# Patient Record
Sex: MALE | Race: OTHER | Employment: OTHER | URBAN - METROPOLITAN AREA
[De-identification: names, ages, dates, MRNs, and addresses within clinical notes are randomized per-mention and may not be internally consistent; named-entity substitution may affect disease eponyms.]

---

## 2017-05-20 DIAGNOSIS — E78.2 MIXED HYPERLIPIDEMIA: Primary | ICD-10-CM

## 2017-05-20 RX ORDER — ATORVASTATIN CALCIUM 40 MG/1
TABLET, FILM COATED ORAL
Qty: 90 TABLET | Refills: 3 | Status: SHIPPED | OUTPATIENT
Start: 2017-05-20 | End: 2018-07-26

## 2017-06-23 ENCOUNTER — TELEPHONE (OUTPATIENT)
Dept: FAMILY MEDICINE CLINIC | Facility: CLINIC | Age: 52
End: 2017-06-23

## 2017-06-23 DIAGNOSIS — Z00.00 LABORATORY EXAMINATION ORDERED AS PART OF A ROUTINE GENERAL MEDICAL EXAMINATION: Primary | ICD-10-CM

## 2017-07-26 ENCOUNTER — APPOINTMENT (OUTPATIENT)
Dept: LAB | Age: 52
End: 2017-07-26
Attending: INTERNAL MEDICINE
Payer: COMMERCIAL

## 2017-07-26 ENCOUNTER — LAB ENCOUNTER (OUTPATIENT)
Dept: LAB | Age: 52
End: 2017-07-26
Attending: FAMILY MEDICINE
Payer: COMMERCIAL

## 2017-07-26 DIAGNOSIS — E78.2 MIXED HYPERLIPIDEMIA: ICD-10-CM

## 2017-07-26 DIAGNOSIS — Z00.00 LABORATORY EXAMINATION ORDERED AS PART OF A ROUTINE GENERAL MEDICAL EXAMINATION: ICD-10-CM

## 2017-07-26 DIAGNOSIS — I10 ESSENTIAL HYPERTENSION: ICD-10-CM

## 2017-07-26 DIAGNOSIS — I51.5 CARDIAC CALCIFICATION (HCC): ICD-10-CM

## 2017-07-26 LAB
25-HYDROXYVITAMIN D (TOTAL): 30.3 NG/ML (ref 30–100)
ALBUMIN SERPL-MCNC: 3.9 G/DL (ref 3.5–4.8)
ALP LIVER SERPL-CCNC: 79 U/L (ref 45–117)
ALT SERPL-CCNC: 44 U/L (ref 17–63)
AST SERPL-CCNC: 23 U/L (ref 15–41)
BASOPHILS # BLD AUTO: 0.04 X10(3) UL (ref 0–0.1)
BASOPHILS NFR BLD AUTO: 0.7 %
BILIRUB SERPL-MCNC: 1.1 MG/DL (ref 0.1–2)
BUN BLD-MCNC: 15 MG/DL (ref 8–20)
CALCIUM BLD-MCNC: 8.9 MG/DL (ref 8.3–10.3)
CHLORIDE: 106 MMOL/L (ref 101–111)
CHOLEST SMN-MCNC: 100 MG/DL (ref ?–200)
CO2: 29 MMOL/L (ref 22–32)
CREAT BLD-MCNC: 0.9 MG/DL (ref 0.7–1.3)
EOSINOPHIL # BLD AUTO: 0.3 X10(3) UL (ref 0–0.3)
EOSINOPHIL NFR BLD AUTO: 5.2 %
ERYTHROCYTE [DISTWIDTH] IN BLOOD BY AUTOMATED COUNT: 12.9 % (ref 11.5–16)
GLUCOSE BLD-MCNC: 96 MG/DL (ref 70–99)
HCT VFR BLD AUTO: 44.5 % (ref 37–53)
HDLC SERPL-MCNC: 38 MG/DL (ref 45–?)
HDLC SERPL: 2.63 {RATIO} (ref ?–4.97)
HGB BLD-MCNC: 15.3 G/DL (ref 13–17)
IMMATURE GRANULOCYTE COUNT: 0.01 X10(3) UL (ref 0–1)
IMMATURE GRANULOCYTE RATIO %: 0.2 %
LDLC SERPL CALC-MCNC: 34 MG/DL (ref ?–130)
LDLC SERPL-MCNC: 28 MG/DL (ref 5–40)
LYMPHOCYTES # BLD AUTO: 2.36 X10(3) UL (ref 0.9–4)
LYMPHOCYTES NFR BLD AUTO: 40.8 %
M PROTEIN MFR SERPL ELPH: 7.2 G/DL (ref 6.1–8.3)
MCH RBC QN AUTO: 31 PG (ref 27–33.2)
MCHC RBC AUTO-ENTMCNC: 34.4 G/DL (ref 31–37)
MCV RBC AUTO: 90.1 FL (ref 80–99)
MONOCYTES # BLD AUTO: 0.71 X10(3) UL (ref 0.1–0.6)
MONOCYTES NFR BLD AUTO: 12.3 %
NEUTROPHIL ABS PRELIM: 2.37 X10 (3) UL (ref 1.3–6.7)
NEUTROPHILS # BLD AUTO: 2.37 X10(3) UL (ref 1.3–6.7)
NEUTROPHILS NFR BLD AUTO: 40.8 %
NONHDLC SERPL-MCNC: 62 MG/DL (ref ?–130)
PLATELET # BLD AUTO: 167 10(3)UL (ref 150–450)
POTASSIUM SERPL-SCNC: 4.1 MMOL/L (ref 3.6–5.1)
RBC # BLD AUTO: 4.94 X10(6)UL (ref 4.3–5.7)
RED CELL DISTRIBUTION WIDTH-SD: 42.2 FL (ref 35.1–46.3)
SODIUM SERPL-SCNC: 142 MMOL/L (ref 136–144)
TRIGLYCERIDES: 139 MG/DL (ref ?–150)
TSI SER-ACNC: 1.47 MIU/ML (ref 0.35–5.5)
WBC # BLD AUTO: 5.8 X10(3) UL (ref 4–13)

## 2017-07-26 PROCEDURE — 80050 GENERAL HEALTH PANEL: CPT | Performed by: FAMILY MEDICINE

## 2017-07-26 PROCEDURE — 80061 LIPID PANEL: CPT | Performed by: FAMILY MEDICINE

## 2017-07-26 PROCEDURE — 82306 VITAMIN D 25 HYDROXY: CPT

## 2017-07-26 PROCEDURE — 36415 COLL VENOUS BLD VENIPUNCTURE: CPT

## 2017-08-02 ENCOUNTER — OFFICE VISIT (OUTPATIENT)
Dept: FAMILY MEDICINE CLINIC | Facility: CLINIC | Age: 52
End: 2017-08-02

## 2017-08-02 VITALS
HEIGHT: 69.75 IN | TEMPERATURE: 98 F | RESPIRATION RATE: 16 BRPM | SYSTOLIC BLOOD PRESSURE: 130 MMHG | HEART RATE: 80 BPM | BODY MASS INDEX: 36.92 KG/M2 | DIASTOLIC BLOOD PRESSURE: 68 MMHG | WEIGHT: 255 LBS

## 2017-08-02 DIAGNOSIS — Z12.11 SCREEN FOR COLON CANCER: ICD-10-CM

## 2017-08-02 DIAGNOSIS — I51.5 CARDIAC CALCIFICATION (HCC): ICD-10-CM

## 2017-08-02 DIAGNOSIS — E78.2 MIXED HYPERLIPIDEMIA: ICD-10-CM

## 2017-08-02 DIAGNOSIS — F33.41 DEPRESSION, MAJOR, RECURRENT, IN PARTIAL REMISSION (HCC): ICD-10-CM

## 2017-08-02 DIAGNOSIS — Z00.00 ANNUAL PHYSICAL EXAM: Primary | ICD-10-CM

## 2017-08-02 DIAGNOSIS — I10 ESSENTIAL HYPERTENSION: ICD-10-CM

## 2017-08-02 PROCEDURE — 99396 PREV VISIT EST AGE 40-64: CPT | Performed by: FAMILY MEDICINE

## 2017-08-02 PROCEDURE — 90715 TDAP VACCINE 7 YRS/> IM: CPT | Performed by: FAMILY MEDICINE

## 2017-08-02 PROCEDURE — 90471 IMMUNIZATION ADMIN: CPT | Performed by: FAMILY MEDICINE

## 2017-08-02 NOTE — PROGRESS NOTES
Yelena Mireles is a 45year old male who presents for a complete physical exam.   HPI:   Patient presents with:  Physical  Colonoscopy Screening: pt is due      His last annual assessment has been over 1 year: Annual Physical due on 01/06/2016       Pt compl Tab TAKE 1 TABLET BY MOUTH EVERY DAY IN THE EVENING   Losartan Potassium 50 MG Oral Tab Take 1 tablet (50 mg total) by mouth daily. aspirin 81 MG Oral Tab EC Take 1 tablet (81 mg total) by mouth daily.      No current facility-administered medications on external ear and ear canal normal.   Left Ear: Tympanic membrane, external ear and ear canal normal.   Nose: Nose normal.   Mouth/Throat: Uvula is midline, oropharynx is clear and moist and mucous membranes are normal. No oropharyngeal exudate.    Eyes: Con PLAN:   Francisco Javier Merritt is a 45year old male who presents for a complete physical exam. Pt's weight is Body mass index is 36.85 kg/m². , recommended low fat diet and aerobic exercise 30 minutes three times weekly.    Health maintenance, UTD Immunizations: UTD such as reading the newspaper or watching television: Not at all  Moving or speaking so slowly that other people could have noticed.  Or the opposite - being so fidgety or restless that you have been moving around a lot more than usual: Not at all  Thoughts

## 2017-08-03 NOTE — ASSESSMENT & PLAN NOTE
PHQ-9 Depression Screen     Little interest or pleasure in doing things: More than half the days  Feeling down, depressed, or hopeless: Not at all  Trouble falling or staying asleep, or sleeping too much: More than half the days  Feeling tired or having

## 2017-08-03 NOTE — ASSESSMENT & PLAN NOTE
Stable, Continue present management.     Cholesterol Lowering Medications          ATORVASTATIN 40 MG Oral Tab

## 2018-01-05 ENCOUNTER — TELEPHONE (OUTPATIENT)
Dept: FAMILY MEDICINE CLINIC | Facility: CLINIC | Age: 53
End: 2018-01-05

## 2018-07-21 ENCOUNTER — APPOINTMENT (OUTPATIENT)
Dept: LAB | Age: 53
End: 2018-07-21
Attending: INTERNAL MEDICINE
Payer: COMMERCIAL

## 2018-07-21 DIAGNOSIS — I10 ESSENTIAL HYPERTENSION WITH GOAL BLOOD PRESSURE LESS THAN 140/90: ICD-10-CM

## 2018-07-21 DIAGNOSIS — E78.2 MIXED HYPERLIPIDEMIA: ICD-10-CM

## 2018-07-21 LAB
ALBUMIN SERPL-MCNC: 4.1 G/DL (ref 3.5–4.8)
ALBUMIN/GLOB SERPL: 1.1 {RATIO} (ref 1–2)
ALP LIVER SERPL-CCNC: 86 U/L (ref 45–117)
ALT SERPL-CCNC: 45 U/L (ref 17–63)
ANION GAP SERPL CALC-SCNC: 7 MMOL/L (ref 0–18)
AST SERPL-CCNC: 23 U/L (ref 15–41)
BILIRUB SERPL-MCNC: 1.2 MG/DL (ref 0.1–2)
BUN BLD-MCNC: 15 MG/DL (ref 8–20)
BUN/CREAT SERPL: 16.1 (ref 10–20)
CALCIUM BLD-MCNC: 9 MG/DL (ref 8.3–10.3)
CHLORIDE SERPL-SCNC: 106 MMOL/L (ref 101–111)
CHOLEST SMN-MCNC: 97 MG/DL (ref ?–200)
CO2 SERPL-SCNC: 29 MMOL/L (ref 22–32)
CREAT BLD-MCNC: 0.93 MG/DL (ref 0.7–1.3)
GLOBULIN PLAS-MCNC: 3.7 G/DL (ref 2.5–3.7)
GLUCOSE BLD-MCNC: 92 MG/DL (ref 70–99)
HDLC SERPL-MCNC: 37 MG/DL (ref 45–?)
HDLC SERPL: 2.62 {RATIO} (ref ?–4.97)
LDLC SERPL CALC-MCNC: 36 MG/DL (ref ?–130)
M PROTEIN MFR SERPL ELPH: 7.8 G/DL (ref 6.1–8.3)
NONHDLC SERPL-MCNC: 60 MG/DL (ref ?–130)
OSMOLALITY SERPL CALC.SUM OF ELEC: 294 MOSM/KG (ref 275–295)
POTASSIUM SERPL-SCNC: 4.2 MMOL/L (ref 3.6–5.1)
SODIUM SERPL-SCNC: 142 MMOL/L (ref 136–144)
TRIGL SERPL-MCNC: 122 MG/DL (ref ?–150)
VLDLC SERPL CALC-MCNC: 24 MG/DL (ref 5–40)

## 2018-07-21 PROCEDURE — 80061 LIPID PANEL: CPT

## 2018-07-21 PROCEDURE — 36415 COLL VENOUS BLD VENIPUNCTURE: CPT

## 2018-07-21 PROCEDURE — 80053 COMPREHEN METABOLIC PANEL: CPT

## 2018-07-26 PROBLEM — R06.83 SNORING: Status: ACTIVE | Noted: 2018-07-26

## 2018-08-03 ENCOUNTER — TELEPHONE (OUTPATIENT)
Dept: FAMILY MEDICINE CLINIC | Facility: CLINIC | Age: 53
End: 2018-08-03

## 2018-08-03 DIAGNOSIS — Z11.59 ENCOUNTER FOR HEPATITIS C SCREENING TEST FOR LOW RISK PATIENT: ICD-10-CM

## 2018-08-03 DIAGNOSIS — Z12.5 SCREENING FOR PROSTATE CANCER: ICD-10-CM

## 2018-08-03 DIAGNOSIS — Z00.00 LABORATORY EXAMINATION ORDERED AS PART OF A ROUTINE GENERAL MEDICAL EXAMINATION: ICD-10-CM

## 2018-08-03 NOTE — TELEPHONE ENCOUNTER
Patient is scheduled for physical 9/19 please place lab orders to [facility]

## 2018-08-30 ENCOUNTER — APPOINTMENT (OUTPATIENT)
Dept: LAB | Age: 53
End: 2018-08-30
Attending: FAMILY MEDICINE
Payer: COMMERCIAL

## 2018-08-30 DIAGNOSIS — Z11.59 ENCOUNTER FOR HEPATITIS C SCREENING TEST FOR LOW RISK PATIENT: ICD-10-CM

## 2018-08-30 DIAGNOSIS — Z12.5 SCREENING FOR PROSTATE CANCER: ICD-10-CM

## 2018-08-30 DIAGNOSIS — Z00.00 LABORATORY EXAMINATION ORDERED AS PART OF A ROUTINE GENERAL MEDICAL EXAMINATION: ICD-10-CM

## 2018-08-30 LAB
COMPLEXED PSA SERPL-MCNC: 0.56 NG/ML (ref 0.01–4)
ERYTHROCYTE [DISTWIDTH] IN BLOOD BY AUTOMATED COUNT: 12.7 % (ref 11.5–16)
HCT VFR BLD AUTO: 46 % (ref 37–53)
HCV AB SERPL QL IA: NONREACTIVE
HGB BLD-MCNC: 15.8 G/DL (ref 13–17)
MCH RBC QN AUTO: 31.6 PG (ref 27–33.2)
MCHC RBC AUTO-ENTMCNC: 34.3 G/DL (ref 31–37)
MCV RBC AUTO: 92 FL (ref 80–99)
PLATELET # BLD AUTO: 183 10(3)UL (ref 150–450)
RBC # BLD AUTO: 5 X10(6)UL (ref 4.3–5.7)
RED CELL DISTRIBUTION WIDTH-SD: 42.7 FL (ref 35.1–46.3)
TSI SER-ACNC: 1.77 MIU/ML (ref 0.35–5.5)
WBC # BLD AUTO: 5.7 X10(3) UL (ref 4–13)

## 2018-08-30 PROCEDURE — 84153 ASSAY OF PSA TOTAL: CPT | Performed by: FAMILY MEDICINE

## 2018-08-30 PROCEDURE — 84443 ASSAY THYROID STIM HORMONE: CPT | Performed by: FAMILY MEDICINE

## 2018-08-30 PROCEDURE — 86803 HEPATITIS C AB TEST: CPT | Performed by: FAMILY MEDICINE

## 2018-08-30 PROCEDURE — 36415 COLL VENOUS BLD VENIPUNCTURE: CPT | Performed by: FAMILY MEDICINE

## 2018-08-30 PROCEDURE — 85027 COMPLETE CBC AUTOMATED: CPT | Performed by: FAMILY MEDICINE

## 2018-10-31 ENCOUNTER — OFFICE VISIT (OUTPATIENT)
Dept: FAMILY MEDICINE CLINIC | Facility: CLINIC | Age: 53
End: 2018-10-31
Payer: COMMERCIAL

## 2018-10-31 VITALS
HEART RATE: 80 BPM | WEIGHT: 250.38 LBS | DIASTOLIC BLOOD PRESSURE: 70 MMHG | SYSTOLIC BLOOD PRESSURE: 132 MMHG | TEMPERATURE: 100 F | BODY MASS INDEX: 35.45 KG/M2 | RESPIRATION RATE: 14 BRPM | HEIGHT: 70.4 IN

## 2018-10-31 DIAGNOSIS — F33.42 MAJOR DEPRESSION, RECURRENT, FULL REMISSION (HCC): ICD-10-CM

## 2018-10-31 DIAGNOSIS — E78.2 MIXED HYPERLIPIDEMIA: ICD-10-CM

## 2018-10-31 DIAGNOSIS — I10 ESSENTIAL HYPERTENSION: ICD-10-CM

## 2018-10-31 DIAGNOSIS — Z00.00 ANNUAL PHYSICAL EXAM: Primary | ICD-10-CM

## 2018-10-31 DIAGNOSIS — D17.20 LIPOMA OF FOREARM: ICD-10-CM

## 2018-10-31 PROCEDURE — 90471 IMMUNIZATION ADMIN: CPT | Performed by: FAMILY MEDICINE

## 2018-10-31 PROCEDURE — 90686 IIV4 VACC NO PRSV 0.5 ML IM: CPT | Performed by: FAMILY MEDICINE

## 2018-10-31 PROCEDURE — 99396 PREV VISIT EST AGE 40-64: CPT | Performed by: FAMILY MEDICINE

## 2018-10-31 NOTE — PROGRESS NOTES
Ariel Bee is a 52year old male who presents for a complete physical exam.   HPI:   Patient presents with:  Physical  Imm/Inj: flu shot      His last annual assessment has been over 1 year: Annual Physical due on 08/02/2018       Pt complains of doing w POTASSIUM 50 MG Oral Tab TAKE 1 TABLET BY MOUTH EVERY DAY   atorvastatin 40 MG Oral Tab TAKE 1 TABLET BY MOUTH EVERY DAY IN THE EVENING   aspirin 81 MG Oral Tab EC Take 1 tablet (81 mg total) by mouth daily.      No current facility-administered medications reviewed. Constitutional: He is oriented to person, place, and time. Vital signs are normal. He appears well-developed and well-nourished. No distress. HENT:   Head: Normocephalic and atraumatic.    Right Ear: Hearing, tympanic membrane, external ear an normal.   Skin: Skin is warm, dry and intact. No rash noted. He is not diaphoretic. No cyanosis or erythema. No pallor. Nails show no clubbing. Psychiatric: He has a normal mood and affect.  His speech is normal and behavior is normal. Judgment and though given.    Return in about 6 months (around 4/30/2019) for recheck.     Bren Mckeon MD, 10/31/2018, 9:04 AM

## 2020-03-26 ENCOUNTER — TELEPHONE (OUTPATIENT)
Dept: FAMILY MEDICINE CLINIC | Facility: CLINIC | Age: 55
End: 2020-03-26

## 2020-03-26 NOTE — TELEPHONE ENCOUNTER
+exposure to COVID from coworker. Works for [employer]. Share space and     Throat itch, feels like taste buds down a little bit. Has a mask he wears to work as of today.    Rest at home    He has had enough of an exposure these symptoms could be early COVI

## 2020-03-26 NOTE — TELEPHONE ENCOUNTER
Patient called requesting guidance, states his co-worker recently tested positive for COVID-19 and is very concerned because started having a sore throat.

## 2020-03-27 ENCOUNTER — TELEPHONE (OUTPATIENT)
Dept: FAMILY MEDICINE CLINIC | Facility: CLINIC | Age: 55
End: 2020-03-27

## 2020-03-27 DIAGNOSIS — J06.9 VIRAL UPPER RESPIRATORY TRACT INFECTION: Primary | ICD-10-CM

## 2020-03-27 DIAGNOSIS — R05.9 COUGH: ICD-10-CM

## 2020-03-27 DIAGNOSIS — R50.9 FEVER, UNSPECIFIED FEVER CAUSE: ICD-10-CM

## 2020-03-27 NOTE — TELEPHONE ENCOUNTER
Pt was exposed to a co-worker who was positive for COVID-19. His wife was also exposed to COVID-19. Pt has fever of 101, chills, dry cough, body aches, sore throat, night sweats. Pt is taking Tylenol and it is not bringing fever down.     Pt is requesting C

## 2020-03-27 NOTE — TELEPHONE ENCOUNTER
Patient is calling requesting to speak to Dr. Yamil Pinedo stating fever has not gone down. Fever of 101 all night with body aches, chills, sweats, and dry cough. Pt spoke to Dr. Jada Bellamy about this yesterday.

## 2020-03-27 NOTE — TELEPHONE ENCOUNTER
Ronnie Phillips is a 60year old male who is being evaluated for respiratory symptoms.   Contact with COVID-19 positive person- coworker  Does the patient currently have or has the patient previously had symptoms of an acute respiratory infection:  Fever (T >

## 2020-03-29 ENCOUNTER — TELEPHONE (OUTPATIENT)
Dept: INTERNAL MEDICINE CLINIC | Facility: CLINIC | Age: 55
End: 2020-03-29

## 2020-03-30 ENCOUNTER — LAB ENCOUNTER (OUTPATIENT)
Dept: LAB | Facility: HOSPITAL | Age: 55
End: 2020-03-30
Attending: FAMILY MEDICINE
Payer: COMMERCIAL

## 2020-03-30 DIAGNOSIS — R05.9 COUGH: ICD-10-CM

## 2020-03-30 DIAGNOSIS — J06.9 VIRAL UPPER RESPIRATORY TRACT INFECTION: ICD-10-CM

## 2020-03-30 DIAGNOSIS — R50.9 FEVER, UNSPECIFIED FEVER CAUSE: ICD-10-CM

## 2020-03-31 ENCOUNTER — TELEPHONE (OUTPATIENT)
Dept: FAMILY MEDICINE CLINIC | Facility: CLINIC | Age: 55
End: 2020-03-31

## 2020-03-31 DIAGNOSIS — Z20.822 SUSPECTED 2019 NOVEL CORONAVIRUS INFECTION: ICD-10-CM

## 2020-03-31 DIAGNOSIS — R50.9 FEVER, UNSPECIFIED FEVER CAUSE: ICD-10-CM

## 2020-03-31 DIAGNOSIS — R05.9 COUGH: ICD-10-CM

## 2020-03-31 DIAGNOSIS — J06.9 VIRAL UPPER RESPIRATORY TRACT INFECTION: Primary | ICD-10-CM

## 2020-03-31 PROCEDURE — 99213 OFFICE O/P EST LOW 20 MIN: CPT | Performed by: FAMILY MEDICINE

## 2020-03-31 NOTE — TELEPHONE ENCOUNTER
Virtual/Telephone Check-In    Cinda Reynoso verbally consents to a Virtual/Telephone Check-In service on 03/31/20. Patient understands and accepts financial responsibility for any deductible, co-insurance and/or co-pays associated with this service.     Dur

## 2020-03-31 NOTE — TELEPHONE ENCOUNTER
Patient called to give updates. Pt is still having a fever, body aches, and chills. Pt also stated it hurts when taking deep breaths. Please contact patient.

## 2020-04-01 ENCOUNTER — TELEPHONE (OUTPATIENT)
Dept: FAMILY MEDICINE CLINIC | Facility: CLINIC | Age: 55
End: 2020-04-01

## 2020-04-01 DIAGNOSIS — U07.1 COVID-19 VIRUS INFECTION: Primary | ICD-10-CM

## 2020-04-01 NOTE — TELEPHONE ENCOUNTER
covid positive follow up call. Better with OTC meds, hot steam and good appetite, fever 97, up to 100  Speaking in full sentences, no increased work of breathing. A&O x 3.      Problem List Items Addressed This Visit     None      Visit Diagnoses     CO

## 2020-04-02 ENCOUNTER — TELEPHONE (OUTPATIENT)
Dept: FAMILY MEDICINE CLINIC | Facility: CLINIC | Age: 55
End: 2020-04-02

## 2020-04-02 NOTE — TELEPHONE ENCOUNTER
Talk to patient, he is about 10 to 15% better today than yesterday and he was better yesterday versus Tuesday. That is good news but today is day 9 so we will continue to follow-up either through my chart or phone calls every day.   He is self isolating an

## 2020-04-04 ENCOUNTER — TELEPHONE (OUTPATIENT)
Dept: FAMILY MEDICINE CLINIC | Facility: CLINIC | Age: 55
End: 2020-04-04

## 2020-04-04 DIAGNOSIS — U07.1 PNEUMONIA DUE TO 2019 NOVEL CORONAVIRUS: ICD-10-CM

## 2020-04-04 DIAGNOSIS — R05.9 COUGH: ICD-10-CM

## 2020-04-04 DIAGNOSIS — J12.82 PNEUMONIA DUE TO 2019 NOVEL CORONAVIRUS: ICD-10-CM

## 2020-04-04 PROCEDURE — 99212 OFFICE O/P EST SF 10 MIN: CPT | Performed by: FAMILY MEDICINE

## 2020-04-04 NOTE — TELEPHONE ENCOUNTER
Hard cough this AM, but breathing ok, low grade tmpe today so still not clear, but day 11 of sx, COVID positicVirtual/Telephone Check-In    Mark Cortes verbally consents to a Virtual/Telephone Check-In service on 04/04/20.   Patient understands and accepts

## 2020-04-05 ENCOUNTER — TELEPHONE (OUTPATIENT)
Dept: INTERNAL MEDICINE CLINIC | Facility: CLINIC | Age: 55
End: 2020-04-05

## 2020-04-05 DIAGNOSIS — R06.02 SOB (SHORTNESS OF BREATH): Primary | ICD-10-CM

## 2020-04-05 RX ORDER — ALBUTEROL SULFATE 90 UG/1
2 AEROSOL, METERED RESPIRATORY (INHALATION) EVERY 4 HOURS PRN
Qty: 1 INHALER | Refills: 0 | Status: SHIPPED | OUTPATIENT
Start: 2020-04-05 | End: 2020-08-28 | Stop reason: ALTCHOICE

## 2020-04-05 NOTE — TELEPHONE ENCOUNTER
Pt paged me at 2:49 pm. Pt tested positive for Covid on 3/30/20. Pt has been having fevers for awhile. This  ,now temperature is 99. Pt states has some congestion, occ cough, but has noticed SOB if going up the stairs from the basement.  Pt states it

## 2020-04-06 ENCOUNTER — TELEPHONE (OUTPATIENT)
Dept: FAMILY MEDICINE CLINIC | Facility: CLINIC | Age: 55
End: 2020-04-06

## 2020-04-08 ENCOUNTER — TELEPHONE (OUTPATIENT)
Dept: FAMILY MEDICINE CLINIC | Facility: CLINIC | Age: 55
End: 2020-04-08

## 2020-04-08 DIAGNOSIS — U07.1 COVID-19 VIRUS INFECTION: Primary | ICD-10-CM

## 2020-04-08 DIAGNOSIS — R05.9 COUGH: ICD-10-CM

## 2020-04-08 PROCEDURE — 99213 OFFICE O/P EST LOW 20 MIN: CPT | Performed by: FAMILY MEDICINE

## 2020-04-08 NOTE — TELEPHONE ENCOUNTER
Virtual/Telephone Check-In    Cinda Reynoso verbally consents to a Virtual/Telephone Check-In service on 4/8/2020. Patient understands and accepts financial responsibility for any deductible, co-insurance and/or co-pays associated with this service.     Pt

## 2020-04-08 NOTE — TELEPHONE ENCOUNTER
COVID 19, positive, doing a lot better. Still some SOB, but much better.  70 % pre illness functinoing, maybe back at work next week   Fever free over 72 hours as of early this morning so I missed his first call because he had actually been able to get out

## 2020-04-13 ENCOUNTER — VIRTUAL PHONE E/M (OUTPATIENT)
Dept: FAMILY MEDICINE CLINIC | Facility: CLINIC | Age: 55
End: 2020-04-13
Payer: COMMERCIAL

## 2020-04-13 DIAGNOSIS — U07.1 COVID-19 VIRUS INFECTION: Primary | ICD-10-CM

## 2020-04-13 DIAGNOSIS — U07.1 PNEUMONIA DUE TO 2019 NOVEL CORONAVIRUS: ICD-10-CM

## 2020-04-13 DIAGNOSIS — J12.82 PNEUMONIA DUE TO 2019 NOVEL CORONAVIRUS: ICD-10-CM

## 2020-04-13 DIAGNOSIS — K21.9 GASTROESOPHAGEAL REFLUX DISEASE WITHOUT ESOPHAGITIS: ICD-10-CM

## 2020-04-13 PROCEDURE — 99214 OFFICE O/P EST MOD 30 MIN: CPT | Performed by: FAMILY MEDICINE

## 2020-04-13 RX ORDER — OMEPRAZOLE 40 MG/1
40 CAPSULE, DELAYED RELEASE ORAL DAILY
Qty: 30 CAPSULE | Refills: 2 | Status: SHIPPED | OUTPATIENT
Start: 2020-04-13 | End: 2020-07-06

## 2020-04-13 NOTE — PROGRESS NOTES
Virtual/Telephone Check-In    Lisandra Corpus verbally consents to a Virtual/Telephone Check-In service on 4/13/2020. Patient understands and accepts financial responsibility for any deductible, co-insurance and/or co-pays associated with this service.     Do

## 2020-07-05 DIAGNOSIS — K21.9 GASTROESOPHAGEAL REFLUX DISEASE WITHOUT ESOPHAGITIS: ICD-10-CM

## 2020-07-06 RX ORDER — OMEPRAZOLE 40 MG/1
CAPSULE, DELAYED RELEASE ORAL
Qty: 90 CAPSULE | Refills: 0 | Status: SHIPPED | OUTPATIENT
Start: 2020-07-06 | End: 2020-07-30

## 2020-07-06 NOTE — TELEPHONE ENCOUNTER
Refill request for:    Requested Prescriptions     Pending Prescriptions Disp Refills   • OMEPRAZOLE 40 MG Oral Capsule Delayed Release [Pharmacy Med Name: OMEPRAZOLE DR 40 MG CAPSULE] 90 capsule 0     Sig: TAKE 1 CAPSULE BY MOUTH EVERY DAY        Last Pre

## 2020-07-14 ENCOUNTER — TELEPHONE (OUTPATIENT)
Dept: FAMILY MEDICINE CLINIC | Facility: CLINIC | Age: 55
End: 2020-07-14

## 2020-07-14 DIAGNOSIS — Z00.00 LABORATORY EXAMINATION ORDERED AS PART OF A ROUTINE GENERAL MEDICAL EXAMINATION: ICD-10-CM

## 2020-07-14 DIAGNOSIS — Z12.5 SCREENING FOR PROSTATE CANCER: ICD-10-CM

## 2020-07-14 NOTE — TELEPHONE ENCOUNTER
Please enter lab orders for the patient's upcoming physical appointment. Physical scheduled:    Your appointments     Date & Time Appointment Department [facility])    Jul 30, 2020  4:00 PM CDT Physical - Established with Chico Rodriguez MD [facility]

## 2020-07-28 ENCOUNTER — APPOINTMENT (OUTPATIENT)
Dept: LAB | Age: 55
End: 2020-07-28
Attending: FAMILY MEDICINE
Payer: COMMERCIAL

## 2020-07-28 DIAGNOSIS — Z12.5 SCREENING FOR PROSTATE CANCER: ICD-10-CM

## 2020-07-28 DIAGNOSIS — Z00.00 LABORATORY EXAMINATION ORDERED AS PART OF A ROUTINE GENERAL MEDICAL EXAMINATION: ICD-10-CM

## 2020-07-28 LAB
ALBUMIN SERPL-MCNC: 4 G/DL (ref 3.4–5)
ALBUMIN/GLOB SERPL: 1.1 {RATIO} (ref 1–2)
ALP LIVER SERPL-CCNC: 83 U/L (ref 45–117)
ALT SERPL-CCNC: 47 U/L (ref 16–61)
ANION GAP SERPL CALC-SCNC: 0 MMOL/L (ref 0–18)
AST SERPL-CCNC: 30 U/L (ref 15–37)
BILIRUB SERPL-MCNC: 0.8 MG/DL (ref 0.1–2)
BUN BLD-MCNC: 11 MG/DL (ref 7–18)
BUN/CREAT SERPL: 11.8 (ref 10–20)
CALCIUM BLD-MCNC: 8.8 MG/DL (ref 8.5–10.1)
CHLORIDE SERPL-SCNC: 108 MMOL/L (ref 98–112)
CHOLEST SMN-MCNC: 99 MG/DL (ref ?–200)
CO2 SERPL-SCNC: 31 MMOL/L (ref 21–32)
COMPLEXED PSA SERPL-MCNC: 0.6 NG/ML (ref ?–4)
CREAT BLD-MCNC: 0.93 MG/DL (ref 0.7–1.3)
DEPRECATED RDW RBC AUTO: 43.6 FL (ref 35.1–46.3)
ERYTHROCYTE [DISTWIDTH] IN BLOOD BY AUTOMATED COUNT: 12.8 % (ref 11–15)
GLOBULIN PLAS-MCNC: 3.6 G/DL (ref 2.8–4.4)
GLUCOSE BLD-MCNC: 91 MG/DL (ref 70–99)
HCT VFR BLD AUTO: 46.6 % (ref 39–53)
HDLC SERPL-MCNC: 36 MG/DL (ref 40–59)
HGB BLD-MCNC: 15.9 G/DL (ref 13–17.5)
LDLC SERPL CALC-MCNC: 43 MG/DL (ref ?–100)
M PROTEIN MFR SERPL ELPH: 7.6 G/DL (ref 6.4–8.2)
MCH RBC QN AUTO: 32.1 PG (ref 26–34)
MCHC RBC AUTO-ENTMCNC: 34.1 G/DL (ref 31–37)
MCV RBC AUTO: 94.1 FL (ref 80–100)
NONHDLC SERPL-MCNC: 63 MG/DL (ref ?–130)
OSMOLALITY SERPL CALC.SUM OF ELEC: 287 MOSM/KG (ref 275–295)
PATIENT FASTING Y/N/NP: YES
PATIENT FASTING Y/N/NP: YES
PLATELET # BLD AUTO: 168 10(3)UL (ref 150–450)
POTASSIUM SERPL-SCNC: 4.2 MMOL/L (ref 3.5–5.1)
RBC # BLD AUTO: 4.95 X10(6)UL (ref 4.3–5.7)
SODIUM SERPL-SCNC: 139 MMOL/L (ref 136–145)
TRIGL SERPL-MCNC: 102 MG/DL (ref 30–149)
TSI SER-ACNC: 1.72 MIU/ML (ref 0.36–3.74)
VLDLC SERPL CALC-MCNC: 20 MG/DL (ref 0–30)
WBC # BLD AUTO: 5.7 X10(3) UL (ref 4–11)

## 2020-07-28 PROCEDURE — 80061 LIPID PANEL: CPT | Performed by: FAMILY MEDICINE

## 2020-07-28 PROCEDURE — 80050 GENERAL HEALTH PANEL: CPT | Performed by: FAMILY MEDICINE

## 2020-07-28 PROCEDURE — 84153 ASSAY OF PSA TOTAL: CPT | Performed by: FAMILY MEDICINE

## 2020-07-28 PROCEDURE — 36415 COLL VENOUS BLD VENIPUNCTURE: CPT | Performed by: FAMILY MEDICINE

## 2020-07-30 ENCOUNTER — OFFICE VISIT (OUTPATIENT)
Dept: FAMILY MEDICINE CLINIC | Facility: CLINIC | Age: 55
End: 2020-07-30
Payer: COMMERCIAL

## 2020-07-30 VITALS
DIASTOLIC BLOOD PRESSURE: 70 MMHG | WEIGHT: 246 LBS | HEART RATE: 80 BPM | SYSTOLIC BLOOD PRESSURE: 136 MMHG | HEIGHT: 70 IN | RESPIRATION RATE: 16 BRPM | BODY MASS INDEX: 35.22 KG/M2 | TEMPERATURE: 98 F

## 2020-07-30 DIAGNOSIS — E78.2 MIXED HYPERLIPIDEMIA: ICD-10-CM

## 2020-07-30 DIAGNOSIS — Z00.00 ANNUAL PHYSICAL EXAM: Primary | ICD-10-CM

## 2020-07-30 DIAGNOSIS — I10 ESSENTIAL HYPERTENSION: ICD-10-CM

## 2020-07-30 DIAGNOSIS — F33.42 MAJOR DEPRESSION, RECURRENT, FULL REMISSION (HCC): ICD-10-CM

## 2020-07-30 PROCEDURE — 3078F DIAST BP <80 MM HG: CPT | Performed by: FAMILY MEDICINE

## 2020-07-30 PROCEDURE — 3075F SYST BP GE 130 - 139MM HG: CPT | Performed by: FAMILY MEDICINE

## 2020-07-30 PROCEDURE — 3008F BODY MASS INDEX DOCD: CPT | Performed by: FAMILY MEDICINE

## 2020-07-30 PROCEDURE — 99396 PREV VISIT EST AGE 40-64: CPT | Performed by: FAMILY MEDICINE

## 2020-07-30 NOTE — PROGRESS NOTES
Nora Gupta is a 60year old male who presents for a complete physical exam.   HPI:   Patient presents with:  Physical: Review Blood Work Results from 07-      His last annual assessment has been over 1 year: Annual Physical due on 10/31/2019 07:06 AM    PSA 0.436 12/22/2014 07:03 AM            LOSARTAN POTASSIUM 50 MG Oral Tab, TAKE 1 TABLET BY MOUTH EVERY DAY  ATORVASTATIN 40 MG Oral Tab, TAKE 1 TABLET BY MOUTH EVERY DAY IN THE EVENING  aspirin 81 MG Oral Tab EC, Take 1 tablet (81 mg total) b Ht 70\"   Wt 246 lb (111.6 kg)   BMI 35.30 kg/m²  Estimated body mass index is 35.3 kg/m² as calculated from the following:    Height as of this encounter: 70\". Weight as of this encounter: 246 lb (111.6 kg).    Physical Exam   Nursing note and vitals erythema. Nails show no clubbing. Psychiatric: He has a normal mood and affect.  His speech is normal and behavior is normal. Judgment and thought content normal. Cognition and memory are normal.       ASSESSMENT AND PLAN:   Lianna Bailey is a 60year old management   Return in about 6 months (around 1/30/2021) for recheck.     Stacie Hill MD, 7/30/2020, 4:27 PM

## 2020-11-07 ENCOUNTER — HOSPITAL ENCOUNTER (OUTPATIENT)
Age: 55
Discharge: HOME | End: 2020-11-07
Attending: EMERGENCY MEDICINE
Payer: COMMERCIAL

## 2020-11-07 ENCOUNTER — APPOINTMENT (OUTPATIENT)
Dept: CT IMAGING | Age: 55
End: 2020-11-07
Attending: EMERGENCY MEDICINE
Payer: COMMERCIAL

## 2020-11-07 VITALS
RESPIRATION RATE: 18 BRPM | WEIGHT: 250 LBS | HEIGHT: 70 IN | TEMPERATURE: 98 F | HEART RATE: 76 BPM | DIASTOLIC BLOOD PRESSURE: 82 MMHG | BODY MASS INDEX: 35.79 KG/M2 | OXYGEN SATURATION: 98 % | SYSTOLIC BLOOD PRESSURE: 132 MMHG

## 2020-11-07 DIAGNOSIS — H11.32 SUBCONJUNCTIVAL HEMORRHAGE OF LEFT EYE: Primary | ICD-10-CM

## 2020-11-07 PROCEDURE — 99214 OFFICE O/P EST MOD 30 MIN: CPT

## 2020-11-07 PROCEDURE — 70450 CT HEAD/BRAIN W/O DYE: CPT | Performed by: EMERGENCY MEDICINE

## 2020-11-07 PROCEDURE — 99204 OFFICE O/P NEW MOD 45 MIN: CPT

## 2020-11-07 NOTE — ED INITIAL ASSESSMENT (HPI)
Pt noticed yesterday some bleeding to lt eye. Also, c/o pressure to back of head that started yesterday. Denies n/v/d. Denies sob. Denies any weakness.

## 2020-11-07 NOTE — ED PROVIDER NOTES
Patient Seen in: [facility]      History   Patient presents with:  Headache    Stated Complaint: eye area issue / head pressure / vein issue in orbital area    HPI    Jenni Branham is a pleasant 47-year-old gentleman, with complaints of eye compla Does not cross the limbus. Otherwise HEENT exam is normal with no midline cervical spine tenderness to palpation.   Lungs normal cardiovascular normal abdomen normal patient is noted to have cranial nerves II through XII intact strength 5 of 5 upper and lo

## 2020-11-16 ENCOUNTER — TELEPHONE (OUTPATIENT)
Dept: FAMILY MEDICINE CLINIC | Facility: CLINIC | Age: 55
End: 2020-11-16

## 2020-11-16 NOTE — TELEPHONE ENCOUNTER
LOV 7/30/2020 and at that time, it stated that pt is currently not on any meds for depression.     Future Appointments   Date Time Provider Carly Sparks   9/8/2021  4:00 PM Michael Espinoza MD [facility]       Pt agrees to a virtual vi

## 2020-11-18 NOTE — PROGRESS NOTES
Virtual/Telephone Check-In    Augie Gutierrez verbally consents to a Virtual/Telephone Check-In service on 11/18/20. Patient has been referred to the [facility] website at [email or web address] to review the yearly Consent to Treat document.   Patient understand 11/18/2020   Trouble concentrating on things, such as reading the newspaper or watching television: 1, done 11/18/2020   If you checked off any problems, how difficult have these problems made it for you to do your work, take care of things at home, or get

## 2020-12-11 NOTE — TELEPHONE ENCOUNTER
Refill request for:    Requested Prescriptions     Pending Prescriptions Disp Refills   • VENLAFAXINE HCL ER 75 MG Oral Capsule SR 24 Hr [Pharmacy Med Name: VENLAFAXINE HCL ER 75 MG CAP] 60 capsule 0     Sig: TAKE 1 CAPSULE BY MOUTH FOR 4 DAYS, THEN TAKE 1

## 2020-12-15 RX ORDER — VENLAFAXINE HYDROCHLORIDE 75 MG/1
CAPSULE, EXTENDED RELEASE ORAL
Qty: 60 CAPSULE | Refills: 0 | OUTPATIENT
Start: 2020-12-15

## 2020-12-15 RX ORDER — VENLAFAXINE HYDROCHLORIDE 75 MG/1
CAPSULE, EXTENDED RELEASE ORAL
Qty: 60 CAPSULE | Refills: 0 | Status: CANCELLED | OUTPATIENT
Start: 2020-12-15 | End: 2021-01-16

## 2020-12-16 RX ORDER — VENLAFAXINE HYDROCHLORIDE 75 MG/1
75 TABLET, EXTENDED RELEASE ORAL 2 TIMES DAILY
Qty: 60 TABLET | Refills: 4 | Status: SHIPPED | OUTPATIENT
Start: 2020-12-16 | End: 2020-12-28

## 2020-12-16 NOTE — TELEPHONE ENCOUNTER
Patient is calling back today, he scheduled his appt but he does not have enough medication to get to that appt he only has 2 pills left. Please give him enough to get to the appt.

## 2020-12-28 NOTE — PROGRESS NOTES
Virtual Telephone Check-In    Hung Hernandez verbally consents to a Virtual/Telephone Check-In visit on 12/28/20. Patient has been referred to the [facility] website at [email or web address] to review the yearly Consent to Treat document.     Patient understand

## 2021-03-15 RX ORDER — VENLAFAXINE HYDROCHLORIDE 75 MG/1
TABLET, EXTENDED RELEASE ORAL
Qty: 180 TABLET | Refills: 1 | Status: SHIPPED | OUTPATIENT
Start: 2021-03-15 | End: 2021-09-03

## 2021-03-15 NOTE — TELEPHONE ENCOUNTER
Refill request for:    Requested Prescriptions     Pending Prescriptions Disp Refills   • VENLAFAXINE HCL ER 75 MG Oral Tablet 24 Hr [Pharmacy Med Name: VENLAFAXINE HCL ER 75 MG TAB] 180 tablet 1     Sig: TAKE 1 TABLET BY MOUTH TWICE A DAY        Last Pres

## 2021-04-07 ENCOUNTER — IMMUNIZATION (OUTPATIENT)
Dept: LAB | Facility: HOSPITAL | Age: 56
End: 2021-04-07
Attending: HOSPITALIST
Payer: COMMERCIAL

## 2021-04-07 DIAGNOSIS — Z23 NEED FOR VACCINATION: Primary | ICD-10-CM

## 2021-04-07 PROCEDURE — 0011A SARSCOV2 VAC 100MCG/0.5ML IM: CPT

## 2021-05-05 ENCOUNTER — IMMUNIZATION (OUTPATIENT)
Dept: LAB | Facility: HOSPITAL | Age: 56
End: 2021-05-05
Attending: EMERGENCY MEDICINE
Payer: COMMERCIAL

## 2021-05-05 DIAGNOSIS — Z23 NEED FOR VACCINATION: Primary | ICD-10-CM

## 2021-05-05 PROCEDURE — 0012A SARSCOV2 VAC 100MCG/0.5ML IM: CPT

## 2021-06-08 DIAGNOSIS — R06.02 SOB (SHORTNESS OF BREATH): ICD-10-CM

## 2021-06-08 RX ORDER — ALBUTEROL SULFATE 90 UG/1
AEROSOL, METERED RESPIRATORY (INHALATION)
Refills: 0 | OUTPATIENT
Start: 2021-06-08

## 2021-08-24 DIAGNOSIS — F33.42 MAJOR DEPRESSION, RECURRENT, FULL REMISSION (HCC): ICD-10-CM

## 2021-08-24 RX ORDER — VENLAFAXINE HYDROCHLORIDE 150 MG/1
150 TABLET, EXTENDED RELEASE ORAL DAILY
Qty: 90 TABLET | Refills: 1 | Status: SHIPPED | OUTPATIENT
Start: 2021-08-24

## 2021-08-24 NOTE — TELEPHONE ENCOUNTER
Refill request for:    Requested Prescriptions     Pending Prescriptions Disp Refills   • Venlafaxine HCl  MG Oral Tablet 24 Hr 90 tablet 1     Sig: Take 1 tablet (150 mg total) by mouth daily.         Last Prescribed Quantity Refills   12/28/2020 90

## 2021-09-03 ENCOUNTER — OFFICE VISIT (OUTPATIENT)
Dept: FAMILY MEDICINE CLINIC | Facility: CLINIC | Age: 56
End: 2021-09-03
Payer: COMMERCIAL

## 2021-09-03 VITALS
DIASTOLIC BLOOD PRESSURE: 78 MMHG | HEIGHT: 70 IN | SYSTOLIC BLOOD PRESSURE: 120 MMHG | RESPIRATION RATE: 14 BRPM | HEART RATE: 78 BPM | WEIGHT: 240.63 LBS | BODY MASS INDEX: 34.45 KG/M2

## 2021-09-03 DIAGNOSIS — Z00.00 LABORATORY EXAMINATION ORDERED AS PART OF A ROUTINE GENERAL MEDICAL EXAMINATION: ICD-10-CM

## 2021-09-03 DIAGNOSIS — K43.9 VENTRAL HERNIA WITHOUT OBSTRUCTION OR GANGRENE: ICD-10-CM

## 2021-09-03 DIAGNOSIS — R73.9 HYPERGLYCEMIA: ICD-10-CM

## 2021-09-03 DIAGNOSIS — R31.1 BENIGN ESSENTIAL MICROSCOPIC HEMATURIA: Primary | ICD-10-CM

## 2021-09-03 LAB
APPEARANCE: CLEAR
BILIRUBIN: NEGATIVE
GLUCOSE (URINE DIPSTICK): NEGATIVE MG/DL
KETONES (URINE DIPSTICK): NEGATIVE MG/DL
LEUKOCYTES: NEGATIVE
MULTISTIX LOT#: ABNORMAL NUMERIC
NITRITE, URINE: NEGATIVE
PH, URINE: 5.5 (ref 4.5–8)
SPECIFIC GRAVITY: 1.03 (ref 1–1.03)
UROBILINOGEN,SEMI-QN: 0.2 MG/DL (ref 0–1.9)

## 2021-09-03 PROCEDURE — 81003 URINALYSIS AUTO W/O SCOPE: CPT | Performed by: FAMILY MEDICINE

## 2021-09-03 PROCEDURE — 99214 OFFICE O/P EST MOD 30 MIN: CPT | Performed by: FAMILY MEDICINE

## 2021-09-03 PROCEDURE — 3008F BODY MASS INDEX DOCD: CPT | Performed by: FAMILY MEDICINE

## 2021-09-03 PROCEDURE — 3074F SYST BP LT 130 MM HG: CPT | Performed by: FAMILY MEDICINE

## 2021-09-03 PROCEDURE — 3078F DIAST BP <80 MM HG: CPT | Performed by: FAMILY MEDICINE

## 2021-09-03 NOTE — PATIENT INSTRUCTIONS
Hematuria: Possible Causes     Many things can lead to blood in the urine (hematuria). The blood may be seen with the eye (macroscopic or gross hematuria). Or it may only be seen when the urine is looked at under a microscope (microscopic hematuria).  Oft professional's instructions. How a Hernia Develops  Although a hernia bulge may appear suddenly, hernias often take years to develop.  They grow larger as pressure inside the body presses the intestines or other tissues out through a weak area in the

## 2021-09-03 NOTE — PROGRESS NOTES
Dajuan Matta is a 60year old male coming in for had concerns including Lump (onn the right side of the stomach, once in awhile get a burning sensation on the skin ) and Urinary (check for blood ). HPI/Subjective:   HPI hematurian on CDL, still mild.  But normal -     Ultrasound Scan Urinary Tract; Future; Expected date: 09/03/2021  -     Uric Acid; Future; Expected date: 09/03/2021  2. Ventral hernia without obstruction or gangrene  3.  Laboratory examination ordered as part of a routine general medical examinati

## 2021-09-10 ENCOUNTER — ORDER TRANSCRIPTION (OUTPATIENT)
Dept: ADMINISTRATIVE | Facility: HOSPITAL | Age: 56
End: 2021-09-10

## 2021-09-10 DIAGNOSIS — E78.2 MIXED HYPERLIPIDEMIA: Primary | ICD-10-CM

## 2021-09-10 DIAGNOSIS — R06.83 SNORING: ICD-10-CM

## 2021-09-10 DIAGNOSIS — I25.10 CORONARY ARTERY DISEASE INVOLVING NATIVE CORONARY ARTERY OF NATIVE HEART WITHOUT ANGINA PECTORIS: ICD-10-CM

## 2021-09-10 DIAGNOSIS — I10 ESSENTIAL HYPERTENSION: ICD-10-CM

## 2021-09-16 RX ORDER — VENLAFAXINE HYDROCHLORIDE 75 MG/1
TABLET, EXTENDED RELEASE ORAL
Qty: 180 TABLET | Refills: 1 | OUTPATIENT
Start: 2021-09-16

## 2021-09-16 NOTE — TELEPHONE ENCOUNTER
Refill request for Venlafaxine 75mg   Pt current dose is 150mg- last filled 8/24/21  Denied the 75mg refill

## 2021-09-18 ENCOUNTER — LAB ENCOUNTER (OUTPATIENT)
Dept: LAB | Age: 56
End: 2021-09-18
Attending: FAMILY MEDICINE
Payer: COMMERCIAL

## 2021-09-18 DIAGNOSIS — R73.9 HYPERGLYCEMIA: ICD-10-CM

## 2021-09-18 DIAGNOSIS — R31.1 BENIGN ESSENTIAL MICROSCOPIC HEMATURIA: ICD-10-CM

## 2021-09-18 DIAGNOSIS — Z00.00 LABORATORY EXAMINATION ORDERED AS PART OF A ROUTINE GENERAL MEDICAL EXAMINATION: ICD-10-CM

## 2021-09-18 LAB
ALBUMIN SERPL-MCNC: 3.9 G/DL (ref 3.4–5)
ALBUMIN/GLOB SERPL: 1.1 {RATIO} (ref 1–2)
ALP LIVER SERPL-CCNC: 83 U/L
ALT SERPL-CCNC: 41 U/L
ANION GAP SERPL CALC-SCNC: 3 MMOL/L (ref 0–18)
AST SERPL-CCNC: 26 U/L (ref 15–37)
BILIRUB SERPL-MCNC: 0.9 MG/DL (ref 0.1–2)
BUN BLD-MCNC: 12 MG/DL (ref 7–18)
CALCIUM BLD-MCNC: 8.9 MG/DL (ref 8.5–10.1)
CHLORIDE SERPL-SCNC: 107 MMOL/L (ref 98–112)
CHOLEST SERPL-MCNC: 95 MG/DL (ref ?–200)
CO2 SERPL-SCNC: 30 MMOL/L (ref 21–32)
CREAT BLD-MCNC: 0.9 MG/DL
ERYTHROCYTE [DISTWIDTH] IN BLOOD BY AUTOMATED COUNT: 13 %
EST. AVERAGE GLUCOSE BLD GHB EST-MCNC: 111 MG/DL (ref 68–126)
GLOBULIN PLAS-MCNC: 3.6 G/DL (ref 2.8–4.4)
GLUCOSE BLD-MCNC: 98 MG/DL (ref 70–99)
HBA1C MFR BLD HPLC: 5.5 % (ref ?–5.7)
HCT VFR BLD AUTO: 44.8 %
HDLC SERPL-MCNC: 40 MG/DL (ref 40–59)
HGB BLD-MCNC: 15.1 G/DL
LDLC SERPL CALC-MCNC: 34 MG/DL (ref ?–100)
MCH RBC QN AUTO: 30.6 PG (ref 26–34)
MCHC RBC AUTO-ENTMCNC: 33.7 G/DL (ref 31–37)
MCV RBC AUTO: 90.9 FL
NONHDLC SERPL-MCNC: 55 MG/DL (ref ?–130)
OSMOLALITY SERPL CALC.SUM OF ELEC: 290 MOSM/KG (ref 275–295)
PATIENT FASTING Y/N/NP: YES
PATIENT FASTING Y/N/NP: YES
PLATELET # BLD AUTO: 178 10(3)UL (ref 150–450)
POTASSIUM SERPL-SCNC: 4.2 MMOL/L (ref 3.5–5.1)
PROT SERPL-MCNC: 7.5 G/DL (ref 6.4–8.2)
RBC # BLD AUTO: 4.93 X10(6)UL
SODIUM SERPL-SCNC: 140 MMOL/L (ref 136–145)
TRIGL SERPL-MCNC: 114 MG/DL (ref 30–149)
TSI SER-ACNC: 1 MIU/ML (ref 0.36–3.74)
URATE SERPL-MCNC: 5.3 MG/DL
VLDLC SERPL CALC-MCNC: 15 MG/DL (ref 0–30)
WBC # BLD AUTO: 4.9 X10(3) UL (ref 4–11)

## 2021-09-18 PROCEDURE — 80061 LIPID PANEL: CPT

## 2021-09-18 PROCEDURE — 84443 ASSAY THYROID STIM HORMONE: CPT

## 2021-09-18 PROCEDURE — 80053 COMPREHEN METABOLIC PANEL: CPT

## 2021-09-18 PROCEDURE — 85027 COMPLETE CBC AUTOMATED: CPT

## 2021-09-18 PROCEDURE — 36415 COLL VENOUS BLD VENIPUNCTURE: CPT

## 2021-09-18 PROCEDURE — 84550 ASSAY OF BLOOD/URIC ACID: CPT

## 2021-09-18 PROCEDURE — 83036 HEMOGLOBIN GLYCOSYLATED A1C: CPT

## 2021-10-27 NOTE — IMAGING NOTE
Spoke with patient and discussed pre-procedure instructions for CTA Coronary gated study. Pt will arrive at [address] [place] [address] to [facility] OP registration for 0800 appt. Pt aware he can eat lunch breakfast and drink extra fluids day before and morning of.  Pt instructed to

## 2021-10-29 ENCOUNTER — HOSPITAL ENCOUNTER (OUTPATIENT)
Dept: CT IMAGING | Facility: HOSPITAL | Age: 56
Discharge: HOME | End: 2021-10-29
Attending: INTERNAL MEDICINE
Payer: COMMERCIAL

## 2021-10-29 ENCOUNTER — HOSPITAL ENCOUNTER (OUTPATIENT)
Dept: ULTRASOUND IMAGING | Facility: HOSPITAL | Age: 56
Discharge: HOME | End: 2021-10-29
Attending: FAMILY MEDICINE
Payer: COMMERCIAL

## 2021-10-29 VITALS — DIASTOLIC BLOOD PRESSURE: 65 MMHG | SYSTOLIC BLOOD PRESSURE: 112 MMHG | HEART RATE: 71 BPM | RESPIRATION RATE: 23 BRPM

## 2021-10-29 DIAGNOSIS — E78.2 MIXED HYPERLIPIDEMIA: ICD-10-CM

## 2021-10-29 DIAGNOSIS — I10 ESSENTIAL HYPERTENSION: ICD-10-CM

## 2021-10-29 DIAGNOSIS — R31.1 BENIGN ESSENTIAL MICROSCOPIC HEMATURIA: ICD-10-CM

## 2021-10-29 DIAGNOSIS — I25.10 CORONARY ARTERY DISEASE INVOLVING NATIVE CORONARY ARTERY OF NATIVE HEART WITHOUT ANGINA PECTORIS: ICD-10-CM

## 2021-10-29 DIAGNOSIS — R06.83 SNORING: ICD-10-CM

## 2021-10-29 PROCEDURE — 0502T CTA FRACTIONAL FLOW RESERVE ANALYSIS (CPT=0503T/0502T): CPT | Performed by: INTERNAL MEDICINE

## 2021-10-29 PROCEDURE — 0503T CTA FRACTIONAL FLOW RESERVE ANALYSIS (CPT=0503T/0502T): CPT | Performed by: INTERNAL MEDICINE

## 2021-10-29 PROCEDURE — 75574 CT ANGIO HRT W/3D IMAGE: CPT | Performed by: INTERNAL MEDICINE

## 2021-10-29 PROCEDURE — 82565 ASSAY OF CREATININE: CPT

## 2021-10-29 PROCEDURE — 76775 US EXAM ABDO BACK WALL LIM: CPT | Performed by: FAMILY MEDICINE

## 2021-10-29 RX ORDER — NITROGLYCERIN 0.4 MG/1
TABLET SUBLINGUAL
Status: COMPLETED
Start: 2021-10-29 | End: 2021-10-29

## 2021-10-29 RX ORDER — METOPROLOL TARTRATE 5 MG/5ML
INJECTION INTRAVENOUS
Status: COMPLETED
Start: 2021-10-29 | End: 2021-10-29

## 2021-10-29 RX ADMIN — METOPROLOL TARTRATE 5 MG: 5 INJECTION INTRAVENOUS at 07:56:00

## 2021-10-29 RX ADMIN — METOPROLOL TARTRATE 5 MG: 5 INJECTION INTRAVENOUS at 08:06:00

## 2021-10-29 RX ADMIN — METOPROLOL TARTRATE 5 MG: 5 INJECTION INTRAVENOUS at 08:01:00

## 2021-10-29 RX ADMIN — METOPROLOL TARTRATE 5 MG: 5 INJECTION INTRAVENOUS at 08:11:00

## 2021-10-29 NOTE — IMAGING NOTE
Pt in [facility] area for recovery post CTA Coronary, PO fluids given to patient, pt denies dizziness, will monitor.  8044 pt ambulated in holding area, pt denies dizziness, IV discontinued, coban applied, pt tolerated well, pt ambulated to [facility] Had Cancer in

## 2021-10-29 NOTE — IMAGING NOTE
Received Cleveland Alanis to [facility]. IV access established 20 gauge to Right AnteCubital area. GFR collected. HR 62. Assisted patient to  CT Rm 4 working with [facility]. Verified name, , allergies.  Pt denies use of long acting nitrates

## 2022-02-21 ENCOUNTER — OFFICE VISIT (OUTPATIENT)
Dept: FAMILY MEDICINE CLINIC | Facility: CLINIC | Age: 57
End: 2022-02-21
Payer: COMMERCIAL

## 2022-02-21 VITALS
BODY MASS INDEX: 35.27 KG/M2 | DIASTOLIC BLOOD PRESSURE: 70 MMHG | SYSTOLIC BLOOD PRESSURE: 136 MMHG | RESPIRATION RATE: 18 BRPM | WEIGHT: 246.38 LBS | HEART RATE: 78 BPM | HEIGHT: 70 IN

## 2022-02-21 DIAGNOSIS — R39.9 UTI SYMPTOMS: Primary | ICD-10-CM

## 2022-02-21 DIAGNOSIS — N41.0 ACUTE PROSTATITIS: ICD-10-CM

## 2022-02-21 DIAGNOSIS — R31.1 BENIGN ESSENTIAL MICROSCOPIC HEMATURIA: ICD-10-CM

## 2022-02-21 LAB
APPEARANCE: CLEAR
BILIRUBIN: NEGATIVE
GLUCOSE (URINE DIPSTICK): NEGATIVE MG/DL
KETONES (URINE DIPSTICK): NEGATIVE MG/DL
LEUKOCYTES: NEGATIVE
MULTISTIX LOT#: ABNORMAL NUMERIC
NITRITE, URINE: NEGATIVE
PH, URINE: 5.5 (ref 4.5–8)
PROTEIN (URINE DIPSTICK): NEGATIVE MG/DL
SPECIFIC GRAVITY: 1.01 (ref 1–1.03)
URINE-COLOR: YELLOW
UROBILINOGEN,SEMI-QN: 0.2 MG/DL (ref 0–1.9)

## 2022-02-21 PROCEDURE — 3075F SYST BP GE 130 - 139MM HG: CPT | Performed by: FAMILY MEDICINE

## 2022-02-21 PROCEDURE — 3008F BODY MASS INDEX DOCD: CPT | Performed by: FAMILY MEDICINE

## 2022-02-21 PROCEDURE — 87086 URINE CULTURE/COLONY COUNT: CPT | Performed by: FAMILY MEDICINE

## 2022-02-21 PROCEDURE — 99213 OFFICE O/P EST LOW 20 MIN: CPT | Performed by: FAMILY MEDICINE

## 2022-02-21 PROCEDURE — 3078F DIAST BP <80 MM HG: CPT | Performed by: FAMILY MEDICINE

## 2022-02-21 PROCEDURE — 81003 URINALYSIS AUTO W/O SCOPE: CPT | Performed by: FAMILY MEDICINE

## 2022-05-06 ENCOUNTER — HOSPITAL ENCOUNTER (OUTPATIENT)
Age: 57
Discharge: HOME | End: 2022-05-06
Payer: COMMERCIAL

## 2022-05-06 VITALS
TEMPERATURE: 97 F | HEART RATE: 90 BPM | SYSTOLIC BLOOD PRESSURE: 145 MMHG | RESPIRATION RATE: 18 BRPM | OXYGEN SATURATION: 98 % | DIASTOLIC BLOOD PRESSURE: 74 MMHG

## 2022-05-06 DIAGNOSIS — M54.31 SCIATICA OF RIGHT SIDE: Primary | ICD-10-CM

## 2022-05-06 PROCEDURE — 99214 OFFICE O/P EST MOD 30 MIN: CPT

## 2022-05-06 PROCEDURE — 96372 THER/PROPH/DIAG INJ SC/IM: CPT

## 2022-05-06 RX ORDER — KETOROLAC TROMETHAMINE 30 MG/ML
30 INJECTION, SOLUTION INTRAMUSCULAR; INTRAVENOUS ONCE
Status: COMPLETED | OUTPATIENT
Start: 2022-05-06 | End: 2022-05-06

## 2022-05-06 RX ORDER — NAPROXEN 500 MG/1
500 TABLET ORAL 2 TIMES DAILY PRN
Qty: 20 TABLET | Refills: 0 | Status: SHIPPED | OUTPATIENT
Start: 2022-05-06

## 2022-05-06 RX ORDER — DEXAMETHASONE 4 MG/1
16 TABLET ORAL ONCE
Status: COMPLETED | OUTPATIENT
Start: 2022-05-06 | End: 2022-05-06

## 2022-05-06 RX ORDER — PREDNISONE 20 MG/1
40 TABLET ORAL DAILY
Qty: 8 TABLET | Refills: 0 | Status: SHIPPED | OUTPATIENT
Start: 2022-05-06 | End: 2022-05-10

## 2022-05-06 RX ORDER — CYCLOBENZAPRINE HCL 10 MG
10 TABLET ORAL NIGHTLY
Qty: 20 TABLET | Refills: 0 | Status: SHIPPED | OUTPATIENT
Start: 2022-05-06

## 2022-05-21 DIAGNOSIS — F33.42 MAJOR DEPRESSION, RECURRENT, FULL REMISSION (HCC): ICD-10-CM

## 2022-05-24 RX ORDER — VENLAFAXINE HYDROCHLORIDE 150 MG/1
TABLET, EXTENDED RELEASE ORAL
Qty: 90 TABLET | Refills: 1 | Status: SHIPPED | OUTPATIENT
Start: 2022-05-24

## 2022-05-27 ENCOUNTER — OFFICE VISIT (OUTPATIENT)
Dept: FAMILY MEDICINE CLINIC | Facility: CLINIC | Age: 57
End: 2022-05-27
Payer: COMMERCIAL

## 2022-05-27 ENCOUNTER — HOSPITAL ENCOUNTER (OUTPATIENT)
Dept: GENERAL RADIOLOGY | Age: 57
Discharge: HOME | End: 2022-05-27
Attending: FAMILY MEDICINE
Payer: COMMERCIAL

## 2022-05-27 VITALS
RESPIRATION RATE: 18 BRPM | HEIGHT: 70 IN | SYSTOLIC BLOOD PRESSURE: 130 MMHG | WEIGHT: 249.81 LBS | BODY MASS INDEX: 35.76 KG/M2 | DIASTOLIC BLOOD PRESSURE: 80 MMHG | HEART RATE: 74 BPM

## 2022-05-27 DIAGNOSIS — M79.10 MYALGIA: ICD-10-CM

## 2022-05-27 DIAGNOSIS — Z82.61 FAMILY HISTORY OF RHEUMATOID ARTHRITIS: ICD-10-CM

## 2022-05-27 DIAGNOSIS — M25.551 RIGHT HIP PAIN: Primary | ICD-10-CM

## 2022-05-27 DIAGNOSIS — Z01.89 ROUTINE LAB DRAW: ICD-10-CM

## 2022-05-27 DIAGNOSIS — M25.551 RIGHT HIP PAIN: ICD-10-CM

## 2022-05-27 DIAGNOSIS — R53.83 FATIGUE, UNSPECIFIED TYPE: ICD-10-CM

## 2022-05-27 PROCEDURE — 72110 X-RAY EXAM L-2 SPINE 4/>VWS: CPT | Performed by: FAMILY MEDICINE

## 2022-05-27 PROCEDURE — 73523 X-RAY EXAM HIPS BI 5/> VIEWS: CPT | Performed by: FAMILY MEDICINE

## 2022-06-03 ENCOUNTER — NURSE ONLY (OUTPATIENT)
Dept: LAB | Age: 57
End: 2022-06-03
Attending: FAMILY MEDICINE
Payer: COMMERCIAL

## 2022-06-03 DIAGNOSIS — R53.83 FATIGUE, UNSPECIFIED TYPE: ICD-10-CM

## 2022-06-03 DIAGNOSIS — M25.551 RIGHT HIP PAIN: ICD-10-CM

## 2022-06-03 DIAGNOSIS — Z82.61 FAMILY HISTORY OF RHEUMATOID ARTHRITIS: ICD-10-CM

## 2022-06-03 DIAGNOSIS — Z01.89 ROUTINE LAB DRAW: ICD-10-CM

## 2022-06-03 LAB
ALBUMIN SERPL-MCNC: 4.1 G/DL (ref 3.4–5)
ALBUMIN/GLOB SERPL: 1.3 {RATIO} (ref 1–2)
ALP LIVER SERPL-CCNC: 79 U/L
ALT SERPL-CCNC: 37 U/L
ANION GAP SERPL CALC-SCNC: 5 MMOL/L (ref 0–18)
AST SERPL-CCNC: 25 U/L (ref 15–37)
BASOPHILS # BLD AUTO: 0.02 X10(3) UL (ref 0–0.2)
BASOPHILS NFR BLD AUTO: 0.5 %
BILIRUB SERPL-MCNC: 1 MG/DL (ref 0.1–2)
BUN BLD-MCNC: 15 MG/DL (ref 7–18)
CALCIUM BLD-MCNC: 9 MG/DL (ref 8.5–10.1)
CHLORIDE SERPL-SCNC: 106 MMOL/L (ref 98–112)
CHOLEST SERPL-MCNC: 103 MG/DL (ref ?–200)
CO2 SERPL-SCNC: 29 MMOL/L (ref 21–32)
CREAT BLD-MCNC: 1.01 MG/DL
CRP SERPL-MCNC: <0.29 MG/DL (ref ?–0.3)
EOSINOPHIL # BLD AUTO: 0.25 X10(3) UL (ref 0–0.7)
EOSINOPHIL NFR BLD AUTO: 6.5 %
ERYTHROCYTE [DISTWIDTH] IN BLOOD BY AUTOMATED COUNT: 13 %
ERYTHROCYTE [SEDIMENTATION RATE] IN BLOOD: 15 MM/HR
EST. AVERAGE GLUCOSE BLD GHB EST-MCNC: 111 MG/DL (ref 68–126)
FASTING PATIENT LIPID ANSWER: YES
FASTING STATUS PATIENT QL REPORTED: YES
GLOBULIN PLAS-MCNC: 3.2 G/DL (ref 2.8–4.4)
GLUCOSE BLD-MCNC: 99 MG/DL (ref 70–99)
HBA1C MFR BLD: 5.5 % (ref ?–5.7)
HCT VFR BLD AUTO: 44.8 %
HDLC SERPL-MCNC: 33 MG/DL (ref 40–59)
HGB BLD-MCNC: 15.3 G/DL
IMM GRANULOCYTES # BLD AUTO: 0 X10(3) UL (ref 0–1)
IMM GRANULOCYTES NFR BLD: 0 %
LDLC SERPL CALC-MCNC: 47 MG/DL (ref ?–100)
LYMPHOCYTES # BLD AUTO: 1.71 X10(3) UL (ref 1–4)
LYMPHOCYTES NFR BLD AUTO: 44.4 %
MCH RBC QN AUTO: 31.6 PG (ref 26–34)
MCHC RBC AUTO-ENTMCNC: 34.2 G/DL (ref 31–37)
MCV RBC AUTO: 92.6 FL
MONOCYTES # BLD AUTO: 0.47 X10(3) UL (ref 0.1–1)
MONOCYTES NFR BLD AUTO: 12.2 %
NEUTROPHILS # BLD AUTO: 1.4 X10 (3) UL (ref 1.5–7.7)
NEUTROPHILS # BLD AUTO: 1.4 X10(3) UL (ref 1.5–7.7)
NEUTROPHILS NFR BLD AUTO: 36.4 %
NONHDLC SERPL-MCNC: 70 MG/DL (ref ?–130)
OSMOLALITY SERPL CALC.SUM OF ELEC: 291 MOSM/KG (ref 275–295)
PLATELET # BLD AUTO: 198 10(3)UL (ref 150–450)
POTASSIUM SERPL-SCNC: 4 MMOL/L (ref 3.5–5.1)
PROT SERPL-MCNC: 7.3 G/DL (ref 6.4–8.2)
RBC # BLD AUTO: 4.84 X10(6)UL
RHEUMATOID FACT SERPL-ACNC: <10 IU/ML (ref ?–15)
SODIUM SERPL-SCNC: 140 MMOL/L (ref 136–145)
TRIGL SERPL-MCNC: 127 MG/DL (ref 30–149)
TSI SER-ACNC: 1.35 MIU/ML (ref 0.36–3.74)
VIT D+METAB SERPL-MCNC: 29.3 NG/ML (ref 30–100)
VLDLC SERPL CALC-MCNC: 18 MG/DL (ref 0–30)
WBC # BLD AUTO: 3.9 X10(3) UL (ref 4–11)

## 2022-06-03 PROCEDURE — 80061 LIPID PANEL: CPT

## 2022-06-03 PROCEDURE — 84443 ASSAY THYROID STIM HORMONE: CPT

## 2022-06-03 PROCEDURE — 86200 CCP ANTIBODY: CPT

## 2022-06-03 PROCEDURE — 85025 COMPLETE CBC W/AUTO DIFF WBC: CPT

## 2022-06-03 PROCEDURE — 86038 ANTINUCLEAR ANTIBODIES: CPT

## 2022-06-03 PROCEDURE — 82306 VITAMIN D 25 HYDROXY: CPT

## 2022-06-03 PROCEDURE — 36415 COLL VENOUS BLD VENIPUNCTURE: CPT

## 2022-06-03 PROCEDURE — 83036 HEMOGLOBIN GLYCOSYLATED A1C: CPT

## 2022-06-03 PROCEDURE — 86140 C-REACTIVE PROTEIN: CPT

## 2022-06-03 PROCEDURE — 80053 COMPREHEN METABOLIC PANEL: CPT

## 2022-06-03 PROCEDURE — 86431 RHEUMATOID FACTOR QUANT: CPT

## 2022-06-03 PROCEDURE — 85652 RBC SED RATE AUTOMATED: CPT

## 2022-06-06 LAB
ANA SER QL: NEGATIVE
CCP IGG SERPL-ACNC: 2.4 U/ML (ref 0–6.9)

## 2022-11-23 DIAGNOSIS — F33.42 MAJOR DEPRESSION, RECURRENT, FULL REMISSION (HCC): ICD-10-CM

## 2022-11-25 ENCOUNTER — LAB ENCOUNTER (OUTPATIENT)
Dept: LAB | Age: 57
End: 2022-11-25
Attending: INTERNAL MEDICINE
Payer: COMMERCIAL

## 2022-11-25 DIAGNOSIS — Z01.818 PREOPERATIVE EXAMINATION, UNSPECIFIED: ICD-10-CM

## 2022-11-25 DIAGNOSIS — Z11.59 SCREENING FOR VIRAL DISEASE: ICD-10-CM

## 2022-11-26 LAB — SARS-COV-2 RNA RESP QL NAA+PROBE: NOT DETECTED

## 2022-11-28 ENCOUNTER — OFFICE VISIT (OUTPATIENT)
Dept: SLEEP CENTER | Age: 57
End: 2022-11-28
Attending: INTERNAL MEDICINE
Payer: COMMERCIAL

## 2022-11-28 DIAGNOSIS — G47.33 OSA (OBSTRUCTIVE SLEEP APNEA): ICD-10-CM

## 2022-11-28 PROCEDURE — 95811 POLYSOM 6/>YRS CPAP 4/> PARM: CPT

## 2022-11-28 RX ORDER — VENLAFAXINE HYDROCHLORIDE 150 MG/1
TABLET, EXTENDED RELEASE ORAL
Qty: 90 TABLET | Refills: 1 | Status: SHIPPED | OUTPATIENT
Start: 2022-11-28

## 2023-04-06 DIAGNOSIS — F33.42 MAJOR DEPRESSION, RECURRENT, FULL REMISSION (HCC): ICD-10-CM

## 2023-04-06 RX ORDER — VENLAFAXINE HYDROCHLORIDE 150 MG/1
150 TABLET, EXTENDED RELEASE ORAL DAILY
Qty: 90 TABLET | Refills: 1 | Status: CANCELLED | OUTPATIENT
Start: 2023-04-06

## 2023-04-06 RX ORDER — VENLAFAXINE HYDROCHLORIDE 150 MG/1
150 TABLET, EXTENDED RELEASE ORAL DAILY
Qty: 90 TABLET | Refills: 0 | Status: CANCELLED | OUTPATIENT
Start: 2023-04-06

## 2023-04-06 NOTE — TELEPHONE ENCOUNTER
Pt called to speak with nurse because he says that he has been without his medication VENLAFAXINE HCL  MG Oral Tablet 24 Hr for a few weeks. He states that the pharmacy was supposed to call the office but no one has reached out to him.      Please advise     CB#[phone]

## 2023-04-06 NOTE — TELEPHONE ENCOUNTER
Wife called stating pt abruptly stopped Effexor 2 weeks ago. Not doing well off of it and is requesting a refill. LOV 5/27/2022  No future appts. Abi informed wife that pt needs to schedule appt. Routed to Dr. Braden Garcia - will you refill Effexor?

## 2023-05-25 ENCOUNTER — LAB ENCOUNTER (OUTPATIENT)
Dept: LAB | Age: 58
End: 2023-05-25
Attending: FAMILY MEDICINE
Payer: COMMERCIAL

## 2023-05-25 DIAGNOSIS — Z00.129 LABORATORY EXAMINATION ORDERED AS PART OF A ROUTINE GENERAL MEDICAL EXAMINATION IN PEDIATRIC PATIENT: ICD-10-CM

## 2023-05-25 DIAGNOSIS — Z12.5 SCREENING FOR PROSTATE CANCER: ICD-10-CM

## 2023-05-25 DIAGNOSIS — Z00.00 LABORATORY EXAMINATION ORDERED AS PART OF A ROUTINE GENERAL MEDICAL EXAMINATION: ICD-10-CM

## 2023-05-25 LAB
ALBUMIN SERPL-MCNC: 4.2 G/DL (ref 3.4–5)
ALBUMIN/GLOB SERPL: 1.3 {RATIO} (ref 1–2)
ALP LIVER SERPL-CCNC: 82 U/L
ALT SERPL-CCNC: 51 U/L
ANION GAP SERPL CALC-SCNC: 3 MMOL/L (ref 0–18)
AST SERPL-CCNC: 34 U/L (ref 15–37)
BILIRUB SERPL-MCNC: 1 MG/DL (ref 0.1–2)
BILIRUB UR QL STRIP.AUTO: NEGATIVE
BUN BLD-MCNC: 18 MG/DL (ref 7–18)
CALCIUM BLD-MCNC: 9.2 MG/DL (ref 8.5–10.1)
CHLORIDE SERPL-SCNC: 106 MMOL/L (ref 98–112)
CHOLEST SERPL-MCNC: 98 MG/DL (ref ?–200)
CO2 SERPL-SCNC: 31 MMOL/L (ref 21–32)
COMPLEXED PSA SERPL-MCNC: 0.92 NG/ML (ref ?–4)
CREAT BLD-MCNC: 0.92 MG/DL
ERYTHROCYTE [DISTWIDTH] IN BLOOD BY AUTOMATED COUNT: 12.8 %
FASTING PATIENT LIPID ANSWER: YES
FASTING STATUS PATIENT QL REPORTED: YES
GFR SERPLBLD BASED ON 1.73 SQ M-ARVRAT: 95 ML/MIN/1.73M2 (ref 60–?)
GLOBULIN PLAS-MCNC: 3.2 G/DL (ref 2.8–4.4)
GLUCOSE BLD-MCNC: 98 MG/DL (ref 70–99)
GLUCOSE UR STRIP.AUTO-MCNC: NEGATIVE MG/DL
HCT VFR BLD AUTO: 44.9 %
HDLC SERPL-MCNC: 35 MG/DL (ref 40–59)
HGB BLD-MCNC: 15.8 G/DL
LDLC SERPL CALC-MCNC: 35 MG/DL (ref ?–100)
LEUKOCYTE ESTERASE UR QL STRIP.AUTO: NEGATIVE
MCH RBC QN AUTO: 32.2 PG (ref 26–34)
MCHC RBC AUTO-ENTMCNC: 35.2 G/DL (ref 31–37)
MCV RBC AUTO: 91.4 FL
NITRITE UR QL STRIP.AUTO: NEGATIVE
NONHDLC SERPL-MCNC: 63 MG/DL (ref ?–130)
OSMOLALITY SERPL CALC.SUM OF ELEC: 292 MOSM/KG (ref 275–295)
PH UR STRIP.AUTO: 5 [PH] (ref 5–8)
PLATELET # BLD AUTO: 185 10(3)UL (ref 150–450)
POTASSIUM SERPL-SCNC: 4.4 MMOL/L (ref 3.5–5.1)
PROT SERPL-MCNC: 7.4 G/DL (ref 6.4–8.2)
PROT UR STRIP.AUTO-MCNC: NEGATIVE MG/DL
RBC # BLD AUTO: 4.91 X10(6)UL
SODIUM SERPL-SCNC: 140 MMOL/L (ref 136–145)
SP GR UR STRIP.AUTO: 1.03 (ref 1–1.03)
TRIGL SERPL-MCNC: 169 MG/DL (ref 30–149)
TSI SER-ACNC: 1.73 MIU/ML (ref 0.36–3.74)
UROBILINOGEN UR STRIP.AUTO-MCNC: <2 MG/DL
VLDLC SERPL CALC-MCNC: 23 MG/DL (ref 0–30)
WBC # BLD AUTO: 4.8 X10(3) UL (ref 4–11)

## 2023-05-25 PROCEDURE — 84153 ASSAY OF PSA TOTAL: CPT | Performed by: FAMILY MEDICINE

## 2023-05-25 PROCEDURE — 80061 LIPID PANEL: CPT | Performed by: FAMILY MEDICINE

## 2023-05-25 PROCEDURE — 81001 URINALYSIS AUTO W/SCOPE: CPT | Performed by: FAMILY MEDICINE

## 2023-05-25 PROCEDURE — 80050 GENERAL HEALTH PANEL: CPT | Performed by: FAMILY MEDICINE

## 2023-06-15 ENCOUNTER — OFFICE VISIT (OUTPATIENT)
Facility: LOCATION | Age: 58
End: 2023-06-15
Payer: COMMERCIAL

## 2023-06-15 VITALS — TEMPERATURE: 97 F | HEART RATE: 89 BPM

## 2023-06-15 DIAGNOSIS — R22.9 MASS OF SKIN: Primary | ICD-10-CM

## 2023-06-15 PROCEDURE — 99242 OFF/OP CONSLTJ NEW/EST SF 20: CPT | Performed by: SURGERY

## 2023-07-07 PROBLEM — D12.2 BENIGN NEOPLASM OF ASCENDING COLON: Status: ACTIVE | Noted: 2023-07-07

## 2023-07-07 PROBLEM — D12.8 BENIGN NEOPLASM OF RECTUM: Status: ACTIVE | Noted: 2023-07-07

## 2023-07-07 PROBLEM — Z86.010 HISTORY OF ADENOMATOUS POLYP OF COLON: Status: ACTIVE | Noted: 2023-07-07

## 2023-07-07 PROBLEM — Z86.0101 HISTORY OF ADENOMATOUS POLYP OF COLON: Status: ACTIVE | Noted: 2023-07-07

## 2023-07-13 RX ORDER — ASPIRIN 81 MG/1
81 TABLET ORAL DAILY
Status: ON HOLD | COMMUNITY
End: 2023-07-26

## 2023-07-14 ENCOUNTER — EKG ENCOUNTER (OUTPATIENT)
Dept: LAB | Age: 58
End: 2023-07-14
Attending: SURGERY
Payer: COMMERCIAL

## 2023-07-14 DIAGNOSIS — Z01.818 PRE-OP TESTING: ICD-10-CM

## 2023-07-14 LAB
ATRIAL RATE: 90 BPM
P AXIS: 12 DEGREES
P-R INTERVAL: 158 MS
Q-T INTERVAL: 356 MS
QRS DURATION: 96 MS
QTC CALCULATION (BEZET): 435 MS
R AXIS: 78 DEGREES
T AXIS: 20 DEGREES
VENTRICULAR RATE: 90 BPM

## 2023-07-14 PROCEDURE — 93005 ELECTROCARDIOGRAM TRACING: CPT

## 2023-07-14 PROCEDURE — 93010 ELECTROCARDIOGRAM REPORT: CPT | Performed by: INTERNAL MEDICINE

## 2023-07-21 ENCOUNTER — TELEPHONE (OUTPATIENT)
Facility: LOCATION | Age: 58
End: 2023-07-21

## 2023-07-21 NOTE — TELEPHONE ENCOUNTER
ransaction ID: [id number] ID: [id number] Date: 2023-07-21  Bryant Otero Patient  Member ID  [id number]    Date of Birth  1965-03-03    Gender  Male    Transaction Type  Outpatient Authorization    Organization  [address] [place] [address]    Pay\[facility] [place] [facility]\"" logo     Certificate Information  Reference Number  [id number]    Status  NO ACTION REQUIRED    Message  Requested Service does not require preauthorization. We would strongly encourage you to check benefits for this service.     Member Information  Patient Name  Bryant Otero    Patient Date of Birth  [id number]    Patient Gender  Male    Member ID  [id number]    Relationship to [address] Name  Bryant Otero    Requesting Provider     Name  Raheem Lazara Putnam 10  [id number]    Tax Id  [id number]    Specialty  [id number]  Provider Role  Provider    Address  [address] [place] [address], [address] [place] [address], [address]    Phone  [phone]  Fax  [phone]    Contact Name  Karel Bocanegra    Service Information  Service Type  2 - Surgical    Place of Service  [facility]    Service From - To Date  2023-07-26 - 2023-10-26    Level of Service  Elective    Diagnosis Code 1  R222 - Localized swelling mass and lump trunk    Diagnosis Code 2   - Localized swelling mass and lump right upper limb    Procedure Code 1 (CPT/HCPCS)  19965 - EXC TR-EXT B9+HUGO 1.1-2 CM    Quantity  3 Units    Status  NO ACTION REQUIRED    Rendering Provider/Facility     Provider 1  Name  Raheem Lazara Putnam 10  [id number]    Specialty  [id number]  Provider Role  Attending    Address  [address] [place] [address], [address] [place] [address], [address]    Provider 2  Name  [facility]  [id number]    Provider Role  Facility    Address  [address] [place] [address], [facility], [address]

## 2023-07-26 ENCOUNTER — ANESTHESIA EVENT (OUTPATIENT)
Dept: SURGERY | Facility: HOSPITAL | Age: 58
End: 2023-07-26
Payer: COMMERCIAL

## 2023-07-26 ENCOUNTER — ANESTHESIA (OUTPATIENT)
Dept: SURGERY | Facility: HOSPITAL | Age: 58
End: 2023-07-26
Payer: COMMERCIAL

## 2023-07-26 ENCOUNTER — HOSPITAL ENCOUNTER (OUTPATIENT)
Facility: HOSPITAL | Age: 58
Setting detail: HOSPITAL OUTPATIENT SURGERY
Discharge: HOME | End: 2023-07-26
Attending: SURGERY | Admitting: SURGERY
Payer: COMMERCIAL

## 2023-07-26 VITALS
HEART RATE: 80 BPM | RESPIRATION RATE: 19 BRPM | OXYGEN SATURATION: 98 % | DIASTOLIC BLOOD PRESSURE: 89 MMHG | TEMPERATURE: 97 F | BODY MASS INDEX: 34.93 KG/M2 | WEIGHT: 244 LBS | HEIGHT: 70 IN | SYSTOLIC BLOOD PRESSURE: 122 MMHG

## 2023-07-26 DIAGNOSIS — Z01.818 PRE-OP TESTING: Primary | ICD-10-CM

## 2023-07-26 DIAGNOSIS — R22.9 MASS OF SKIN: ICD-10-CM

## 2023-07-26 PROCEDURE — 88304 TISSUE EXAM BY PATHOLOGIST: CPT | Performed by: SURGERY

## 2023-07-26 PROCEDURE — 0JB60ZZ EXCISION OF CHEST SUBCUTANEOUS TISSUE AND FASCIA, OPEN APPROACH: ICD-10-PCS | Performed by: SURGERY

## 2023-07-26 PROCEDURE — 0JBG0ZZ EXCISION OF RIGHT LOWER ARM SUBCUTANEOUS TISSUE AND FASCIA, OPEN APPROACH: ICD-10-PCS | Performed by: SURGERY

## 2023-07-26 PROCEDURE — 0JBD0ZZ EXCISION OF RIGHT UPPER ARM SUBCUTANEOUS TISSUE AND FASCIA, OPEN APPROACH: ICD-10-PCS | Performed by: SURGERY

## 2023-07-26 RX ORDER — HYDROMORPHONE HYDROCHLORIDE 1 MG/ML
0.4 INJECTION, SOLUTION INTRAMUSCULAR; INTRAVENOUS; SUBCUTANEOUS EVERY 5 MIN PRN
Status: DISCONTINUED | OUTPATIENT
Start: 2023-07-26 | End: 2023-07-26

## 2023-07-26 RX ORDER — HYDROCODONE BITARTRATE AND ACETAMINOPHEN 5; 325 MG/1; MG/1
1 TABLET ORAL EVERY 6 HOURS PRN
Qty: 12 TABLET | Refills: 0 | Status: SHIPPED | OUTPATIENT
Start: 2023-07-26

## 2023-07-26 RX ORDER — HYDROMORPHONE HYDROCHLORIDE 1 MG/ML
0.2 INJECTION, SOLUTION INTRAMUSCULAR; INTRAVENOUS; SUBCUTANEOUS EVERY 5 MIN PRN
Status: DISCONTINUED | OUTPATIENT
Start: 2023-07-26 | End: 2023-07-26

## 2023-07-26 RX ORDER — NALOXONE HYDROCHLORIDE 0.4 MG/ML
80 INJECTION, SOLUTION INTRAMUSCULAR; INTRAVENOUS; SUBCUTANEOUS AS NEEDED
Status: DISCONTINUED | OUTPATIENT
Start: 2023-07-26 | End: 2023-07-26

## 2023-07-26 RX ORDER — ACETAMINOPHEN 500 MG
1000 TABLET ORAL ONCE AS NEEDED
Status: DISCONTINUED | OUTPATIENT
Start: 2023-07-26 | End: 2023-07-26

## 2023-07-26 RX ORDER — HYDROMORPHONE HYDROCHLORIDE 1 MG/ML
0.6 INJECTION, SOLUTION INTRAMUSCULAR; INTRAVENOUS; SUBCUTANEOUS EVERY 5 MIN PRN
Status: DISCONTINUED | OUTPATIENT
Start: 2023-07-26 | End: 2023-07-26

## 2023-07-26 RX ORDER — HYDROCODONE BITARTRATE AND ACETAMINOPHEN 5; 325 MG/1; MG/1
1 TABLET ORAL ONCE AS NEEDED
Status: DISCONTINUED | OUTPATIENT
Start: 2023-07-26 | End: 2023-07-26

## 2023-07-26 RX ORDER — ASPIRIN 81 MG/1
81 TABLET ORAL DAILY
Refills: 0 | Status: SHIPPED | COMMUNITY
Start: 2023-07-29

## 2023-07-26 RX ORDER — HEPARIN SODIUM 5000 [USP'U]/ML
INJECTION, SOLUTION INTRAVENOUS; SUBCUTANEOUS
Status: COMPLETED
Start: 2023-07-26 | End: 2023-07-26

## 2023-07-26 RX ORDER — HEPARIN SODIUM 5000 [USP'U]/ML
5000 INJECTION, SOLUTION INTRAVENOUS; SUBCUTANEOUS ONCE
Status: COMPLETED | OUTPATIENT
Start: 2023-07-26 | End: 2023-07-26

## 2023-07-26 RX ORDER — CEFAZOLIN SODIUM/WATER 2 G/20 ML
2 SYRINGE (ML) INTRAVENOUS ONCE
Status: COMPLETED | OUTPATIENT
Start: 2023-07-26 | End: 2023-07-26

## 2023-07-26 RX ORDER — SODIUM CHLORIDE, SODIUM LACTATE, POTASSIUM CHLORIDE, CALCIUM CHLORIDE 600; 310; 30; 20 MG/100ML; MG/100ML; MG/100ML; MG/100ML
INJECTION, SOLUTION INTRAVENOUS CONTINUOUS
Status: DISCONTINUED | OUTPATIENT
Start: 2023-07-26 | End: 2023-07-26

## 2023-07-26 RX ORDER — HYDROCODONE BITARTRATE AND ACETAMINOPHEN 5; 325 MG/1; MG/1
2 TABLET ORAL ONCE AS NEEDED
Status: DISCONTINUED | OUTPATIENT
Start: 2023-07-26 | End: 2023-07-26

## 2023-07-26 RX ORDER — SCOLOPAMINE TRANSDERMAL SYSTEM 1 MG/1
1 PATCH, EXTENDED RELEASE TRANSDERMAL ONCE
Status: DISCONTINUED | OUTPATIENT
Start: 2023-07-26 | End: 2023-07-26 | Stop reason: HOSPADM

## 2023-07-26 RX ORDER — ONDANSETRON 2 MG/ML
4 INJECTION INTRAMUSCULAR; INTRAVENOUS EVERY 6 HOURS PRN
Status: DISCONTINUED | OUTPATIENT
Start: 2023-07-26 | End: 2023-07-26

## 2023-07-26 RX ORDER — LIDOCAINE HYDROCHLORIDE AND EPINEPHRINE 10; 10 MG/ML; UG/ML
INJECTION, SOLUTION INFILTRATION; PERINEURAL AS NEEDED
Status: DISCONTINUED | OUTPATIENT
Start: 2023-07-26 | End: 2023-07-26 | Stop reason: HOSPADM

## 2023-07-26 RX ORDER — MIDAZOLAM HYDROCHLORIDE 1 MG/ML
1 INJECTION INTRAMUSCULAR; INTRAVENOUS EVERY 5 MIN PRN
Status: DISCONTINUED | OUTPATIENT
Start: 2023-07-26 | End: 2023-07-26

## 2023-07-26 RX ORDER — LIDOCAINE HYDROCHLORIDE 10 MG/ML
INJECTION, SOLUTION EPIDURAL; INFILTRATION; INTRACAUDAL; PERINEURAL AS NEEDED
Status: DISCONTINUED | OUTPATIENT
Start: 2023-07-26 | End: 2023-07-26 | Stop reason: SURG

## 2023-07-26 RX ORDER — ACETAMINOPHEN 500 MG
1000 TABLET ORAL ONCE
Status: DISCONTINUED | OUTPATIENT
Start: 2023-07-26 | End: 2023-07-26 | Stop reason: HOSPADM

## 2023-07-26 RX ORDER — MEPERIDINE HYDROCHLORIDE 25 MG/ML
12.5 INJECTION INTRAMUSCULAR; INTRAVENOUS; SUBCUTANEOUS AS NEEDED
Status: DISCONTINUED | OUTPATIENT
Start: 2023-07-26 | End: 2023-07-26

## 2023-07-26 RX ORDER — PROCHLORPERAZINE EDISYLATE 5 MG/ML
5 INJECTION INTRAMUSCULAR; INTRAVENOUS EVERY 8 HOURS PRN
Status: DISCONTINUED | OUTPATIENT
Start: 2023-07-26 | End: 2023-07-26

## 2023-07-26 RX ORDER — DIPHENHYDRAMINE HYDROCHLORIDE 50 MG/ML
12.5 INJECTION INTRAMUSCULAR; INTRAVENOUS AS NEEDED
Status: DISCONTINUED | OUTPATIENT
Start: 2023-07-26 | End: 2023-07-26

## 2023-07-26 RX ORDER — CEFAZOLIN SODIUM/WATER 2 G/20 ML
SYRINGE (ML) INTRAVENOUS
Status: DISCONTINUED
Start: 2023-07-26 | End: 2023-07-26

## 2023-07-26 RX ADMIN — CEFAZOLIN SODIUM/WATER 2 G: 2 G/20 ML SYRINGE (ML) INTRAVENOUS at 14:45:00

## 2023-07-26 RX ADMIN — LIDOCAINE HYDROCHLORIDE 100 MG: 10 INJECTION, SOLUTION EPIDURAL; INFILTRATION; INTRACAUDAL; PERINEURAL at 14:37:00

## 2023-07-26 RX ADMIN — SODIUM CHLORIDE, SODIUM LACTATE, POTASSIUM CHLORIDE, CALCIUM CHLORIDE: 600; 310; 30; 20 INJECTION, SOLUTION INTRAVENOUS at 14:30:00

## 2023-07-26 NOTE — BRIEF OP NOTE
Pre-Operative Diagnosis: Mass of skin [R22.9]     Post-Operative Diagnosis: Mass of skin [R22.9]      Procedure Performed:   EXCISION MASSES OF RIGHT ARM TIMES THREE AND CHEST TIMES ONE    Surgeon(s) and Role:     Uriel Moreno DO - Primary    Assistant(s):        Surgical Findings:      Specimen:      Estimated Blood Loss: Blood Output: 5 mL (7/26/2023  3:23 PM)      Dictation Number:      Keagan Curtis DO  7/26/2023  3:29 PM

## 2023-07-26 NOTE — DISCHARGE INSTRUCTIONS
Home Care Instructions      MEDICATIONS  For post-operative pain control the mediations are usually over the counter preparations such as Advil and Tylenol. For severe pain the patient may overlap Advil with Tylenol. The patient can do this by taking two Tylenol, then three hours later taking two Advil, then three hours later taking Tylenol again. Please ask your surgeon before resuming blood thinners such as aspirin, Plavix or Coumadin. All other home medications may be resumed as scheduled. Generally aspirin is avoided for ten days. DIET  The patient may resume a general diet immediately. There should be no alcohol consumption in the immediate recover time period. If the patient was sedated for the procedure the first meal should be light. WOUND CARE  The top dressing may be removed the day after surgery. This includes the gauze, tape and band-aids if they are present. Do not remove the steri-strips or butterfly tapes that are white and adherent to the skin. The steri-strips will eventually peel up at the ends and at this point they may be removed. This is usually seven to ten days after surgery. The patient may shower the day after surgery. There is no need to cover the incisions, and all top gauze type dressing should be removed prior to showering. Soap can get on the wounds but do not scrub over the wounds. No hair dye or chemicals of any kind should get in the wounds. Avoid tub baths for two weeks. Most wounds will be closed with dissolving suture underneath the skin. These sutures will dissolve on their own. The doctor or nurse will remove any visible sutures, or staples, in the office. ACTIVITY  The patient may ride in a car but should not drive the car for at least overnight if sedation was used. If no sedation was used the patient may drive immediately. The patient may return to work the next day. Avoid any activity that could lead to the wound getting elbowed or bumped. Avoid bending, pushing, pulling and lifting anything heavier than 25 pounds for two weeks. Patients should seek further activity limits at the time of their appointment. No extreme sports for two weeks. APPOINTMENT  Please call our office today for an appointment within five to ten days of discharge. Any fever greater than 100.5, chills, nausea, vomiting, or severe diarrhea please call our office. If the wound turns red, hot, swollen, becomes increasingly painful, or drains pus call us immediately at [phone]. For life threatening emergencies call 911. For non-emergent care please call our office after 8:30 a.m. Monday through Friday. The number listed above is our office number. Our phone automatically switches to out answering service if we are not there. Please do not use the emergency room for non-urgent care. Thank you for entrusting us with your care.   [facility]--General Surgery

## 2023-07-26 NOTE — OPERATIVE REPORT
[facility]    PATIENT'S NAME: Sb SAENZ   ATTENDING PHYSICIAN: Cesilia Kasper D.O.   OPERATING PHYSICIAN: Cesilia Kasper D.O.   PATIENT ACCOUNT#:   [de-identified]    LOCATION:  [place] [address]  MEDICAL RECORD #:   [id number]       YOB: 1965  ADMISSION DATE:       07/26/2023      OPERATION DATE:  07/26/2023    OPERATIVE REPORT      PREOPERATIVE DIAGNOSIS:    1. Soft tissue mass, right anterior chest.   2.   Soft tissue mass, right arm deltoid. 3.   Soft tissue mass, right antecubital.   4.   Soft tissue mass, right upper arm distal posterior. POSTOPERATIVE DIAGNOSIS:    PROCEDURE:    1. Excision of soft tissue mass of chest, 2.1 cm in size. 2.   Excision of soft tissue mass, right upper arm distal, 2.8 cm in size. 3.   Excision, right antecubital soft tissue mass, 1.1 cm in size. 4.   Excision, soft tissue mass, right deltoid 1.5 cm in size. ANESTHESIA:  General.    COMPLICATIONS:  None. OPERATIVE TECHNIQUE:  An informed consent was previously obtained. The patient was taken to the operative suite and placed in a supine position. General inhalational anesthetic was administered by the anesthesia department, and the right arm and right anterior chest were prepped and draped in usual sterile fashion. The skin and subcutaneous tissue overlying the mass of the right antecubital was infiltrated with 1% Xylocaine with epinephrine. A transverse skin incision was made using a #15 scalpel blade. Sharp dissection carried down through skin and subcutaneous tissue, and the underlying soft tissue mass was circumferentially excised intact and handed off for pathological evaluation. Hemostasis was secured using handheld electrocautery, and the skin edges were approximated using 4-0 Monocryl in a subdermal interrupted fashion. Overlying Mastisol, Steri-Strips, gauze, and Tegaderm were applied. Identical procedure was carried out at all locations.   The patient was transported from the operative suite to Recovery in stable condition.     Dictated By Celia Lind D.O.  d: 07/26/2023 15:32:56  t: 07/26/2023 18:09:09  [facility] [id number]  ZK/    cc: Mark Foote D.O.

## 2023-07-26 NOTE — ANESTHESIA PROCEDURE NOTES
Airway  Date/Time: 7/26/2023 2:38 PM  Urgency: elective      General Information and Staff    Patient location during procedure: OR  Anesthesiologist: Christina Willett MD  Performed: anesthesiologist   Performed by: Christina Willett MD  Authorized by: Christina Willett MD      Indications and Patient Condition  Indications for airway management: anesthesia  Sedation level: deep  Preoxygenated: yes  Patient position: sniffing  Mask difficulty assessment: 1 - vent by mask    Final Airway Details  Final airway type: supraglottic airway      Successful airway: classic  Size 2.5       Number of attempts at approach: 1

## 2023-08-08 ENCOUNTER — OFFICE VISIT (OUTPATIENT)
Facility: LOCATION | Age: 58
End: 2023-08-08
Payer: COMMERCIAL

## 2023-08-08 VITALS — HEART RATE: 93 BPM | TEMPERATURE: 97 F

## 2023-08-08 DIAGNOSIS — D17.9 ANGIOLIPOMA: ICD-10-CM

## 2023-08-08 DIAGNOSIS — Z98.890 POSTOPERATIVE STATE: Primary | ICD-10-CM

## 2023-08-08 PROCEDURE — 99024 POSTOP FOLLOW-UP VISIT: CPT

## 2024-03-06 DIAGNOSIS — F33.42 MAJOR DEPRESSION, RECURRENT, FULL REMISSION (HCC): ICD-10-CM

## 2024-03-07 RX ORDER — VENLAFAXINE HYDROCHLORIDE 150 MG/1
150 CAPSULE, EXTENDED RELEASE ORAL DAILY
Qty: 90 CAPSULE | Refills: 3 | OUTPATIENT
Start: 2024-03-07

## 2024-03-08 DIAGNOSIS — F33.42 MAJOR DEPRESSION, RECURRENT, FULL REMISSION (HCC): ICD-10-CM

## 2024-03-11 RX ORDER — VENLAFAXINE HYDROCHLORIDE 150 MG/1
150 CAPSULE, EXTENDED RELEASE ORAL DAILY
Qty: 90 CAPSULE | Refills: 3 | OUTPATIENT
Start: 2024-03-11

## 2024-04-28 ENCOUNTER — APPOINTMENT (OUTPATIENT)
Dept: GENERAL RADIOLOGY | Age: 59
End: 2024-04-28
Attending: EMERGENCY MEDICINE
Payer: COMMERCIAL

## 2024-04-28 ENCOUNTER — HOSPITAL ENCOUNTER (OUTPATIENT)
Facility: HOSPITAL | Age: 59
Setting detail: OBSERVATION
Discharge: HOME | End: 2024-04-29
Attending: EMERGENCY MEDICINE | Admitting: HOSPITALIST
Payer: COMMERCIAL

## 2024-04-28 DIAGNOSIS — R07.9 CHEST PAIN OF UNCERTAIN ETIOLOGY: Primary | ICD-10-CM

## 2024-04-28 LAB
ALBUMIN SERPL-MCNC: 4.1 G/DL (ref 3.4–5)
ALBUMIN/GLOB SERPL: 1.1 {RATIO} (ref 1–2)
ALP LIVER SERPL-CCNC: 101 U/L
ALT SERPL-CCNC: 43 U/L
ANION GAP SERPL CALC-SCNC: 5 MMOL/L (ref 0–18)
APTT PPP: 28.8 SECONDS (ref 23.3–35.6)
AST SERPL-CCNC: 33 U/L (ref 15–37)
BASOPHILS # BLD AUTO: 0.04 X10(3) UL (ref 0–0.2)
BASOPHILS NFR BLD AUTO: 0.5 %
BILIRUB SERPL-MCNC: 0.9 MG/DL (ref 0.1–2)
BUN BLD-MCNC: 22 MG/DL (ref 9–23)
CALCIUM BLD-MCNC: 9 MG/DL (ref 8.5–10.1)
CHLORIDE SERPL-SCNC: 106 MMOL/L (ref 98–112)
CO2 SERPL-SCNC: 29 MMOL/L (ref 21–32)
CREAT BLD-MCNC: 1.15 MG/DL
EGFRCR SERPLBLD CKD-EPI 2021: 72 ML/MIN/1.73M2 (ref 60–?)
EOSINOPHIL # BLD AUTO: 0.31 X10(3) UL (ref 0–0.7)
EOSINOPHIL NFR BLD AUTO: 4.2 %
ERYTHROCYTE [DISTWIDTH] IN BLOOD BY AUTOMATED COUNT: 12.9 %
GLOBULIN PLAS-MCNC: 3.6 G/DL (ref 2.8–4.4)
GLUCOSE BLD-MCNC: 101 MG/DL (ref 70–99)
HCT VFR BLD AUTO: 44 %
HGB BLD-MCNC: 15.9 G/DL
IMM GRANULOCYTES # BLD AUTO: 0.02 X10(3) UL (ref 0–1)
IMM GRANULOCYTES NFR BLD: 0.3 %
INR BLD: 1.05 (ref 0.8–1.2)
LYMPHOCYTES # BLD AUTO: 1.84 X10(3) UL (ref 1–4)
LYMPHOCYTES NFR BLD AUTO: 24.7 %
MCH RBC QN AUTO: 32.2 PG (ref 26–34)
MCHC RBC AUTO-ENTMCNC: 36.1 G/DL (ref 31–37)
MCV RBC AUTO: 89.1 FL
MONOCYTES # BLD AUTO: 0.9 X10(3) UL (ref 0.1–1)
MONOCYTES NFR BLD AUTO: 12.1 %
NEUTROPHILS # BLD AUTO: 4.34 X10 (3) UL (ref 1.5–7.7)
NEUTROPHILS # BLD AUTO: 4.34 X10(3) UL (ref 1.5–7.7)
NEUTROPHILS NFR BLD AUTO: 58.2 %
OSMOLALITY SERPL CALC.SUM OF ELEC: 293 MOSM/KG (ref 275–295)
PLATELET # BLD AUTO: 192 10(3)UL (ref 150–450)
POTASSIUM SERPL-SCNC: 4 MMOL/L (ref 3.5–5.1)
PROT SERPL-MCNC: 7.7 G/DL (ref 6.4–8.2)
PROTHROMBIN TIME: 13.7 SECONDS (ref 11.6–14.8)
RBC # BLD AUTO: 4.94 X10(6)UL
SODIUM SERPL-SCNC: 140 MMOL/L (ref 136–145)
TROPONIN I SERPL HS-MCNC: 5 NG/L
WBC # BLD AUTO: 7.5 X10(3) UL (ref 4–11)

## 2024-04-28 PROCEDURE — 71045 X-RAY EXAM CHEST 1 VIEW: CPT | Performed by: EMERGENCY MEDICINE

## 2024-04-28 PROCEDURE — 99223 1ST HOSP IP/OBS HIGH 75: CPT | Performed by: HOSPITALIST

## 2024-04-28 RX ORDER — POLYETHYLENE GLYCOL 3350 17 G/17G
17 POWDER, FOR SOLUTION ORAL DAILY PRN
Status: DISCONTINUED | OUTPATIENT
Start: 2024-04-28 | End: 2024-04-29

## 2024-04-28 RX ORDER — SODIUM CHLORIDE 9 MG/ML
INJECTION, SOLUTION INTRAVENOUS CONTINUOUS
Status: ACTIVE | OUTPATIENT
Start: 2024-04-28 | End: 2024-04-29

## 2024-04-28 RX ORDER — ONDANSETRON 2 MG/ML
4 INJECTION INTRAMUSCULAR; INTRAVENOUS EVERY 6 HOURS PRN
Status: DISCONTINUED | OUTPATIENT
Start: 2024-04-28 | End: 2024-04-29

## 2024-04-28 RX ORDER — BISACODYL 10 MG
10 SUPPOSITORY, RECTAL RECTAL
Status: DISCONTINUED | OUTPATIENT
Start: 2024-04-28 | End: 2024-04-29

## 2024-04-28 RX ORDER — ENEMA 19; 7 G/133ML; G/133ML
1 ENEMA RECTAL ONCE AS NEEDED
Status: DISCONTINUED | OUTPATIENT
Start: 2024-04-28 | End: 2024-04-29

## 2024-04-28 RX ORDER — PROCHLORPERAZINE EDISYLATE 5 MG/ML
5 INJECTION INTRAMUSCULAR; INTRAVENOUS EVERY 8 HOURS PRN
Status: DISCONTINUED | OUTPATIENT
Start: 2024-04-28 | End: 2024-04-29

## 2024-04-28 RX ORDER — ASPIRIN 81 MG/1
324 TABLET, CHEWABLE ORAL ONCE
Status: DISCONTINUED | OUTPATIENT
Start: 2024-04-28 | End: 2024-04-29

## 2024-04-28 RX ORDER — NITROGLYCERIN 0.4 MG/1
0.4 TABLET SUBLINGUAL EVERY 5 MIN PRN
Status: DISCONTINUED | OUTPATIENT
Start: 2024-04-28 | End: 2024-04-29

## 2024-04-28 RX ORDER — HEPARIN SODIUM 5000 [USP'U]/ML
5000 INJECTION, SOLUTION INTRAVENOUS; SUBCUTANEOUS EVERY 12 HOURS SCHEDULED
Status: DISCONTINUED | OUTPATIENT
Start: 2024-04-28 | End: 2024-04-29

## 2024-04-28 RX ORDER — MELATONIN
3 NIGHTLY PRN
Status: DISCONTINUED | OUTPATIENT
Start: 2024-04-28 | End: 2024-04-29

## 2024-04-28 RX ORDER — ACETAMINOPHEN 500 MG
500 TABLET ORAL EVERY 4 HOURS PRN
Status: DISCONTINUED | OUTPATIENT
Start: 2024-04-28 | End: 2024-04-29

## 2024-04-28 RX ORDER — ASPIRIN 81 MG/1
324 TABLET, CHEWABLE ORAL ONCE
Status: COMPLETED | OUTPATIENT
Start: 2024-04-28 | End: 2024-04-28

## 2024-04-28 RX ORDER — ASPIRIN 325 MG
325 TABLET ORAL DAILY
Status: DISCONTINUED | OUTPATIENT
Start: 2024-04-29 | End: 2024-04-29

## 2024-04-28 RX ORDER — SENNOSIDES 8.6 MG
17.2 TABLET ORAL NIGHTLY PRN
Status: DISCONTINUED | OUTPATIENT
Start: 2024-04-28 | End: 2024-04-29

## 2024-04-29 ENCOUNTER — APPOINTMENT (OUTPATIENT)
Dept: CV DIAGNOSTICS | Facility: HOSPITAL | Age: 59
End: 2024-04-29
Attending: INTERNAL MEDICINE
Payer: COMMERCIAL

## 2024-04-29 ENCOUNTER — APPOINTMENT (OUTPATIENT)
Dept: INTERVENTIONAL RADIOLOGY/VASCULAR | Facility: HOSPITAL | Age: 59
End: 2024-04-29
Attending: INTERNAL MEDICINE
Payer: COMMERCIAL

## 2024-04-29 VITALS
BODY MASS INDEX: 35.66 KG/M2 | HEART RATE: 68 BPM | HEIGHT: 70 IN | DIASTOLIC BLOOD PRESSURE: 76 MMHG | SYSTOLIC BLOOD PRESSURE: 117 MMHG | WEIGHT: 249.13 LBS | RESPIRATION RATE: 18 BRPM | OXYGEN SATURATION: 96 % | TEMPERATURE: 98 F

## 2024-04-29 LAB
ANION GAP SERPL CALC-SCNC: 1 MMOL/L (ref 0–18)
BUN BLD-MCNC: 20 MG/DL (ref 9–23)
CALCIUM BLD-MCNC: 8.9 MG/DL (ref 8.5–10.1)
CHLORIDE SERPL-SCNC: 111 MMOL/L (ref 98–112)
CHOLEST SERPL-MCNC: 95 MG/DL (ref ?–200)
CO2 SERPL-SCNC: 27 MMOL/L (ref 21–32)
CREAT BLD-MCNC: 0.93 MG/DL
EGFRCR SERPLBLD CKD-EPI 2021: 93 ML/MIN/1.73M2 (ref 60–?)
EST. AVERAGE GLUCOSE BLD GHB EST-MCNC: 111 MG/DL (ref 68–126)
GLUCOSE BLD-MCNC: 108 MG/DL (ref 70–99)
HBA1C MFR BLD: 5.5 % (ref ?–5.7)
HDLC SERPL-MCNC: 33 MG/DL (ref 40–59)
LDLC SERPL CALC-MCNC: 14 MG/DL (ref ?–100)
MAGNESIUM SERPL-MCNC: 2.4 MG/DL (ref 1.6–2.6)
NONHDLC SERPL-MCNC: 62 MG/DL (ref ?–130)
OSMOLALITY SERPL CALC.SUM OF ELEC: 291 MOSM/KG (ref 275–295)
POTASSIUM SERPL-SCNC: 4.1 MMOL/L (ref 3.5–5.1)
SODIUM SERPL-SCNC: 139 MMOL/L (ref 136–145)
TRIGL SERPL-MCNC: 342 MG/DL (ref 30–149)
TROPONIN I SERPL HS-MCNC: 6 NG/L
TROPONIN I SERPL HS-MCNC: <3 NG/L
VLDLC SERPL CALC-MCNC: 43 MG/DL (ref 0–30)

## 2024-04-29 PROCEDURE — 4A023N7 MEASUREMENT OF CARDIAC SAMPLING AND PRESSURE, LEFT HEART, PERCUTANEOUS APPROACH: ICD-10-PCS | Performed by: INTERNAL MEDICINE

## 2024-04-29 PROCEDURE — 93306 TTE W/DOPPLER COMPLETE: CPT | Performed by: INTERNAL MEDICINE

## 2024-04-29 PROCEDURE — B2111ZZ FLUOROSCOPY OF MULTIPLE CORONARY ARTERIES USING LOW OSMOLAR CONTRAST: ICD-10-PCS | Performed by: INTERNAL MEDICINE

## 2024-04-29 PROCEDURE — 99232 SBSQ HOSP IP/OBS MODERATE 35: CPT | Performed by: HOSPITALIST

## 2024-04-29 RX ORDER — HEPARIN SODIUM 5000 [USP'U]/ML
INJECTION, SOLUTION INTRAVENOUS; SUBCUTANEOUS
Status: COMPLETED
Start: 2024-04-29 | End: 2024-04-29

## 2024-04-29 RX ORDER — MIDAZOLAM HYDROCHLORIDE 1 MG/ML
INJECTION INTRAMUSCULAR; INTRAVENOUS
Status: COMPLETED
Start: 2024-04-29 | End: 2024-04-29

## 2024-04-29 RX ORDER — ATORVASTATIN CALCIUM 40 MG/1
40 TABLET, FILM COATED ORAL NIGHTLY
Status: DISCONTINUED | OUTPATIENT
Start: 2024-04-29 | End: 2024-04-29

## 2024-04-29 RX ORDER — NITROGLYCERIN 20 MG/100ML
INJECTION INTRAVENOUS
Status: COMPLETED
Start: 2024-04-29 | End: 2024-04-29

## 2024-04-29 RX ORDER — LIDOCAINE HYDROCHLORIDE 10 MG/ML
INJECTION, SOLUTION EPIDURAL; INFILTRATION; INTRACAUDAL; PERINEURAL
Status: COMPLETED
Start: 2024-04-29 | End: 2024-04-29

## 2024-04-29 RX ORDER — ATORVASTATIN CALCIUM 40 MG/1
40 TABLET, FILM COATED ORAL DAILY
Status: DISCONTINUED | OUTPATIENT
Start: 2024-04-29 | End: 2024-04-29

## 2024-04-29 RX ORDER — VENLAFAXINE HYDROCHLORIDE 75 MG/1
150 CAPSULE, EXTENDED RELEASE ORAL DAILY
Status: DISCONTINUED | OUTPATIENT
Start: 2024-04-29 | End: 2024-04-29

## 2024-04-29 RX ORDER — LOSARTAN POTASSIUM 50 MG/1
50 TABLET ORAL DAILY
Status: DISCONTINUED | OUTPATIENT
Start: 2024-04-29 | End: 2024-04-29

## 2024-04-29 RX ORDER — VERAPAMIL HYDROCHLORIDE 2.5 MG/ML
INJECTION, SOLUTION INTRAVENOUS
Status: COMPLETED
Start: 2024-04-29 | End: 2024-04-29

## 2024-04-29 NOTE — PROGRESS NOTES
04/28/24 2338 04/28/24 2340 04/28/24 2342   Vital Signs   Pulse 80 83 93   /83 129/75 112/74   MAP (mmHg) 97 91 85   BP Location Left arm Left arm Left arm   BP Method Automatic Automatic Automatic   Patient Position Lying Sitting Standing       Asymptomatic

## 2024-04-29 NOTE — PROGRESS NOTES
[facility]   part of [facility]     Hospitalist Progress Note     Mello Alanis Patient Status:  Inpatient    3/3/1965 MRN [id number]   Location [facility] 2NE-A Attending Isaac Maldonado MD   Hosp Day # 1 PCP Kingsley Manzo MD     Chief Complaint: Chest pain, lightheadedness     Subjective:     Patient is feeling better today.  No chest pain, no lightheadedness.  No sob.  No other acute complaints.      Objective:    Review of Systems:   A comprehensive review of systems was completed; pertinent positive and negatives stated in subjective.    Vital signs:  Temp:  [98.2 °F (36.8 °C)] 98.2 °F (36.8 °C)  Pulse:  [74-93] 74  Resp:  [17-24] 24  BP: (112-162)/(72-83) 127/83  SpO2:  [95 %-99 %] 96 %    Physical Exam:    General: No acute distress pleasant   Respiratory: No wheezes, no rhonchi  Cardiovascular: S1, S2, regular rate and rhythm  Abdomen: Soft, Non-tender, non-distended, positive bowel sounds  Neuro: No new focal deficits.   Extremities: No edema    Diagnostic Data:    Labs:  Recent Labs   Lab 24   WBC 7.5   HGB 15.9   MCV 89.1   .0   INR 1.05       Recent Labs   Lab 24  0621   * 108*   BUN 22 20   CREATSERUM 1.15 0.93   CA 9.0 8.9   ALB 4.1  --     139   K 4.0 4.1    111   CO2 29.0 27.0   ALKPHO 101  --    AST 33  --    ALT 43  --    BILT 0.9  --    TP 7.7  --        Estimated Creatinine Clearance: 86.1 mL/min (based on SCr of 0.93 mg/dL).    Recent Labs   Lab 24  2336 24  0621   TROPHS 5 6 <3       Recent Labs   Lab 24   PTP 13.7   INR 1.05                  Microbiology    No results found for this visit on 24.      Imaging: Reviewed in Epic.    Medications:    venlafaxine ER  150 mg Oral Daily    atorvastatin  40 mg Oral Nightly    aspirin  324 mg Oral Once    aspirin  325 mg Oral Daily    heparin  5,000 Units Subcutaneous 2 times per day       Assessment & Plan:      #Chest pain  with typical features  Elevated coronary calcium score 2016 followed by neg stress  EKG nonischemic  Trop neg  Asa/nitro  Monitor on telelmetry  Cariology to evaluate     #Essential HTN - losartan   #HLD - lipitor   #Depression - venlafaxine   #HERI  #Obesity - BMI 35.74       Isaac Maldonado MD    Supplementary Documentation:     Quality:  DVT Mechanical Prophylaxis:   SCDs,    DVT Pharmacologic Prophylaxis   Medication    heparin (Porcine) 5000 UNIT/ML injection 5,000 Units    DVT Pharmacologic prophylaxis: Aspirin 325 mg DVT Pharmacologic prophylaxis: Aspirin 162 mg         Code Status: Not on file  Fallon: No urinary catheter in place  Fallon Duration (in days):   Central line:    SEVEN: 4/30/2024    Discharge is dependent on: clinical status   At this point Mr. Conrado Alanis is expected to be discharge to: Home    The 21st Century Cures Act makes medical notes like these available to patients in the interest of transparency. Please be advised this is a medical document. Medical documents are intended to carry relevant information, facts as evident, and the clinical opinion of the practitioner. The medical note is intended as peer to peer communication and may appear blunt or direct. It is written in medical language and may contain abbreviations or verbiage that are unfamiliar.

## 2024-04-29 NOTE — ED PROVIDER NOTES
Patient Seen in: [facility] [place]      History     Chief Complaint   Patient presents with    Dizziness     Stated Complaint: dizziness    Subjective:   HPI    Patient is a pleasant 59-year-old male with a history of hypertension, hypercholesterolemia who presents with episode of weakness, near syncope.  Today he was mowing the lawn.  Says he has a mower he walks behind.  Started to feel weak.  He got diaphoretic.  Complained of mild chest pressure and some jaw pain.  Says he stopped and went inside.  Felt lightheaded and dizzy.  Symptoms have resolved.  An episode last week where after sitting on the couch she got up.  Felt dizzy and lightheaded as well.  No chest pain.  That lasted over an hour till he started feeling better.  He has no known coronary disease.  He had a negative CTA in the past.  He did have a high calcium score.  Wife is at bedside and helps with her history.    Objective:   Past Medical History:    Decorative tattoo    Depression    Essential hypertension    Hemorrhoids    High blood pressure    High cholesterol    Hx of motion sickness    Hyperlipidemia    Sleep apnea    CPAP              Past Surgical History:   Procedure Laterality Date    Colonoscopy      Colonoscopy      Other      shoulder rt                Social History     Socioeconomic History    Marital status:    Tobacco Use    Smoking status: Never    Smokeless tobacco: Never   Vaping Use    Vaping status: Never Used   Substance and Sexual Activity    Alcohol use: Yes     Comment: Social Drinker    Drug use: No   Other Topics Concern    Caffeine Concern Yes     Comment: more than 5 cups per day    Exercise Yes     Comment: 1x per week    Seat Belt Yes              Review of Systems    Positive for stated complaint: dizziness  Other systems are as noted in HPI.  Constitutional and vital signs reviewed.      All other systems reviewed and negative except as noted above.    Physical Exam     ED Triage  Vitals [04/28/24 1934]   BP (!) 162/80   Pulse 89   Resp 22   Temp    Temp src    SpO2 99 %   O2 Device None (Room air)       Current:BP (!) 162/80   Pulse 89   Resp 22   Ht 177.8 cm (5' 10\")   Wt 113.4 kg   SpO2 99%   BMI 35.87 kg/m²         Physical Exam    General: Patient is resting comfortably in no acute distress  HEENT: Normal cephalic atraumatic.  Nonicteric sclera.  Moist mucous membranes.  No meningismus.  No adenopathy  Lungs: No tachypnea.  Lungs clear to auscultation bilaterally without rales/rhonchi.  Equal breath sounds bilaterally  Cardiac: No tachycardia.  No murmurs.  Regular rate and rhythm.  Abdomen: Soft and nontender throughout.  No rebound or guarding  Extremities: No clubbing/cyanosis/edema.  Skin: No rashes, no pallor  Neuro: Awake oriented ×3.  Nonfocal.  Good strength throughout    ED Course     Labs Reviewed   PROTHROMBIN TIME (PT) - Normal   PTT, ACTIVATED - Normal   CBC WITH DIFFERENTIAL WITH PLATELET    Narrative:     The following orders were created for panel order CBC With Differential With Platelet.  Procedure                               Abnormality         Status                     ---------                               -----------         ------                     CBC W/ DIFFERENTIAL[[id number]]                              Final result                 Please view results for these tests on the individual orders.   COMP METABOLIC PANEL (14)   TROPONIN I HIGH SENSITIVITY   CBC W/ DIFFERENTIAL     EKG    Rate, intervals and axes as noted on EKG Report.  Rate: 80  Rhythm: Sinus Rhythm  Reading: Normal sinus rhythm.  No acute ST-T wave changes.  Axis/intervals are noted.  Otherwise, agree with EKG report              Heart Score:    HEART Score      Title      Criteria Score   Age: 45-64 Age Score: 1   History: Highly Suspicious Hx Score: 2     EKG: Normal EKG Score: 0   HTN: Yes   Hypercholesterolemia: Yes   Atherosclerosis/PVD: No     DM: No   BMI>30kg/m2: Yes   Smoking:  No   Family History: No         Other Risk Factor Score: 3             No results found for: \"TROP\", \"TROPHS\"        HEART Score: 5        Risk of adverse cardiac event is 12-16.6%              Chest x-ray: I personally reviewed the films and my independent interpertaion showed no acute pathology.  Official report reviewed       Blood work reviewed.  Troponin negative.  MDM      Patient is a 59-year-old male with a history of hyperlipidemia, hypertension, high calcium score presents with 2 suspicious episodes.  Last 1 today when he was mowing the lawn.  Diaphoresis with mild chest tightness and some jaw discomfort.  Resolved with rest.  No clear EKG changes.  Will check troponin.  Was given baby aspirin.    Patient's heart score is 5.  Will admit to the hospital for serial troponins, cardiology evaluation.  Has seen [facility] cardiology in the past.    Patient was given aspirin.  Discussed with cardiology as well as the [facility].  Admit for serial enzymes, further testing.                           Medical Decision Making      Disposition and Plan     Clinical Impression:  1. Chest pain of uncertain etiology         Disposition:  There is no disposition on file for this visit.  There is no disposition time on file for this visit.    Follow-up:  No follow-up provider specified.        Medications Prescribed:  Current Discharge Medication List

## 2024-04-29 NOTE — PROCEDURES
OPERATIVE REPORT - CARDIAC CATHETERIZATION    Date of Procedure: 4/29/2024     Performing Physician: Dashawn Samuel MD    Pre-Procedure Diagnosis:   1. Chest pain  2. CAD with abnormal coronary CTA    Post-Procedure Diagnosis:  1. Same as pre    Findings:  1. Left main: No stenosis  2. LAD: Heavily calcified proximal LAD, but only luminal irregularities < 30%, several small diagonals  3. Left circumflex: luminal irregularities    4. RCA: Dominant to PDA, mild diffuse plaquing ~30%  5. Hemodynamics: LVEDP < 10 mmHg. No LV-Ao gradient on pullback.     Conclusions / Recommendations:  1. Remove the TR band per protocol  2. Post cath IVF  3. Continue medical therapy    -----------------------    Procedures performed:   1. Left heart catheterization  2. Selective bilateral coronary angiography  3. Moderate sedation  4. Access of right radial artery    Indications: This is a 59 year old male presenting for left heart catheterization with coronary angiography to evaluate for obstructive CAD.     Description of Procedure: Informed consent was obtained from the patient in writing. The risks and benefits of the procedure were reviewed in detail with the patient, including but not limited to the risk of myocardial infarction, stroke, renal failure, bleeding, and death. After a thorough discussion of these risks and benefits, the patient agreed to proceed and signed an informed consent document. The patient was brought to the cardiac catheterization laboratory in the fasting state. Moderate sedation was employed using a total of IV Versed 4 mg and IV fentanyl 100 mcg in divided doses.  I directly observed the patient from 1034 to 1127, and an independent trained observer was present and assisted in the monitoring of the patient's level of consciousness and physiological status, heart rate, blood pressure, oximetry, and rhythm. All operators present for the case performed standard pre-procedural prep including hand washing,  sterile gloves, gown, mask, and cap. All aspects of the maximum sterile barrier technique were followed. A preprocedural time out was performed with all physicians, technologists, and nurses involved with the procedure. 1% lidocaine was infiltrated subcutaneously in the right wrist for local anesthesia. Access was obtained in the right radial artery with a 5/6F Slender Glidesheath using the Seldinger technique and a micropuncture needle with a single anterior wall puncture. Standard vasodilator cocktail was given with heparin 5000 units, nitroglycerin 200 mcg and verapamil 2.5 mg through the arterial sheath. A 6F TIG catheter was advanced over a J tipped wire through the arterial sheath and placed in the aortic root but this could not engage either coronary artery due to subclavian tortuosity. We tried a JR4, then finally an AR mod was able to fit the RCA. We suspected we might need to perform flow wire assessment at least of the LAD based on prior CT, so we then tried an EBU 3.5, EBU 3.75, EBU 4.0, AL 1, then finally an Ikari 4 guide was able to.selectively engage the left coronary artery. Standard angiographic views were taken in orthogonal projections for angiography. At one point the JR4 catheter was prolapsed into the LV over a wire and placed at the base of the LV. LV systolic and end diastolic pressure was then recorded. The catheter was pulled back and pullback measurements of LV and aortic pressure and recorded. All catheter exchanges were performed over a wire. The catheter was then removed over a wire. At the conclusion of the procedure, all catheters and wires were removed over a wire. The arterial sheath was removed and hemostasis achieved with standard TR band using patent hemostasis technique. There was no bleeding or hematoma. The patient tolerated the procedure well without complication. EBL <10 mL. Total contrast ~ 150 mL. See procedure log for fluoro time and radiation dose.      Dashawn Samuel,  MD  Interventional Cardiology  [facility]

## 2024-04-29 NOTE — ED INITIAL ASSESSMENT (HPI)
Pt to ED with dizziness, first episode about a week ago with a near syncopal episode. Today he was doing yard work, started to feel weak and diaphoretic with dizziness. + left sided CP and jaw pain.

## 2024-04-29 NOTE — PLAN OF CARE
Assumed care of patient from [facility] at 2328. Aox4, denies any current chest pain. 95% on RA, breath sounds clear B/L. NSR, orthostatic BP done. Continent, last BM was 4/28/24. Skin check was performed with Hayley CRAWFORD     Orders received and initiated.   Oriented to room. Bed is locked and in low position. Call light and personal items within reach.  Pt and family updated with plan of care.        Problem: Patient/Family Goals  Goal: Patient/Family Long Term Goal  Description: Patient's Long Term Goal: Stay out of hospital    Interventions:  - medication compliance  -follow up appointments   - See additional Care Plan goals for specific interventions  4/29/2024 0202 by Kelly Roblero, RN  Outcome: Progressing  4/29/2024 0201 by Kelly Roblero, RN  Note: Long Term Goal: Say out of the hospital     Interventions:   Medication Compliance  Follow Up apts.   Goal: Patient/Family Short Term Goal  Description: Patient's Short Term Goal: Help manage chest pain     Interventions:   - ASA,   - See additional Care Plan goals for specific interventions  Outcome: Progressing

## 2024-04-29 NOTE — H&P
[facility]  History and Physical     Mello Alanis Patient Status:  Emergency    3/3/1965 MRN [id number]   Location [facility] [place] Attending Omar Lopez MD   Hosp Day # 0 PCP Kingsley Manzo MD     Chief Complaint: \"I feel dizzy and have chest pain\"    Subjective:    History of Present Illness:     Mello Alanis is a 59 year old male with past medical history essential hypertension, hyperlipidemia, depression, HERI presents to the emergency room with complaints of feeling lightheaded/dizzy and associated chest pain.  He first felt a sensation of lightheadedness last week but it went away on its own.  Patient was out mowing the lawn today he started feeling diaphoretic weak with associated chest pain that radiated up to his jaw.  Stated lasted for a bit and then spontaneously resolved.  He otherwise denies any fevers, cough, chills, nausea, vomiting, diarrhea, constipation.    -S/p aspirin and cardiology consult in the emergency room      History/Other:    Past Medical History:  Past Medical History:    Decorative tattoo    Depression    Essential hypertension    Hemorrhoids    High blood pressure    High cholesterol    Hx of motion sickness    Hyperlipidemia    Sleep apnea    CPAP     Past Surgical History:   Past Surgical History:   Procedure Laterality Date    Colonoscopy      Colonoscopy      Other      shoulder rt      Family History:   Family History   Problem Relation Age of Onset    Heart Attack Father     Stroke Mother     Colon Cancer Maternal Aunt     Breast Cancer Maternal Aunt     Ovarian Cancer Maternal Aunt     Colon Polyps Self     Prostate Cancer Maternal Grandfather      Social History:    reports that he has never smoked. He has never used smokeless tobacco. He reports current alcohol use. He reports that he does not use drugs.     Allergies: No Known Allergies    Medications:    No current facility-administered medications on file  prior to encounter.     Current Outpatient Medications on File Prior to Encounter   Medication Sig Dispense Refill    aspirin 81 MG Oral Tab EC Take 1 tablet (81 mg total) by mouth daily.  0    venlafaxine  MG Oral Capsule SR 24 Hr Take 1 capsule (150 mg total) by mouth daily. 90 capsule 3    ATORVASTATIN 40 MG Oral Tab TAKE 1 TABLET BY MOUTH EVERY DAY IN THE EVENING 90 tablet 3    losartan Potassium 50 MG Oral Tab Take 1 tablet (50 mg total) by mouth daily. 90 tablet 3    HYDROcodone-acetaminophen 5-325 MG Oral Tab Take 1 tablet by mouth every 6 (six) hours as needed. (Patient not taking: Reported on 4/28/2024) 12 tablet 0       Review of Systems:   A comprehensive review of systems was completed.    Pertinent positives and negatives noted in the HPI.    Objective:   Physical Exam:    /72   Pulse 78   Resp 20   Ht 5' 10\" (1.778 m)   Wt 250 lb (113.4 kg)   SpO2 96%   BMI 35.87 kg/m²   General: No acute distress, Alert  Respiratory: No rhonchi, no wheezes  Cardiovascular: S1, S2. Regular rate and rhythm  Abdomen: Soft, Non-tender, non-distended, positive bowel sounds  Neuro: No new focal deficits  Extremities: No edema      Results:    Labs:      Labs Last 24 Hours:    Recent Labs   Lab 04/28/24 1939   RBC 4.94   HGB 15.9   HCT 44.0   MCV 89.1   MCH 32.2   MCHC 36.1   RDW 12.9   NEPRELIM 4.34   WBC 7.5   .0       Recent Labs   Lab 04/28/24 1939   *   BUN 22   CREATSERUM 1.15   EGFRCR 72   CA 9.0   ALB 4.1      K 4.0      CO2 29.0   ALKPHO 101   AST 33   ALT 43   BILT 0.9   TP 7.7       Lab Results   Component Value Date    INR 1.05 04/28/2024       Recent Labs   Lab 04/28/24 1939   TROPHS 5       No results for input(s): \"TROP\", \"PBNP\" in the last 168 hours.    No results for input(s): \"PCT\" in the last 168 hours.    Imaging: Imaging data reviewed in Epic.    Assessment & Plan:      #Chest pain with typical features  #Elevated coronary calcium score as  outpatient  -Cardiology consult in the emergency room  -Trend troponins  -Telemetry  -Aspirin/nitro   -Lipid panel/A1c    #Essential hypertension    #Hyperlipidemia    #Depression    #HERI     #Obesity  -BMI 35.87        Plan of care discussed with ER physician & patient    Johnathon Potter DO    Supplementary Documentation:     The 21st Century Cures Act makes medical notes like these available to patients in the interest of transparency. Please be advised this is a medical document. Medical documents are intended to carry relevant information, facts as evident, and the clinical opinion of the practitioner. The medical note is intended as peer to peer communication and may appear blunt or direct. It is written in medical language and may contain abbreviations or verbiage that are unfamiliar.

## 2024-04-29 NOTE — PLAN OF CARE
Patient is aox3, vss,ra,lungs  clear, NSR. Denies any chest pain. +BS, no edema.  NPO. Cath today with Dr Samuel. Torres etienne. Consent signed. 0.9ns ready for angiogram.  Echo ordered.  R radial approach, TR band. No intervention. Ok to eat cardiac diet.  0.9ns 50 ml/hr x4 hours and vitals per protocol.  OK to dc per Dr Carroll.  FU with Dr Rosas in Sept (already scheduled) and Dr Manzo in 2 weeks.  DC home.  Problem: Patient/Family Goals  Goal: Patient/Family Long Term Goal  Description: Patient's Long Term Goal: Stay out of hospital    Interventions:  - medication compliance  -follow up appointments   - See additional Care Plan goals for specific interventions  Outcome: Progressing  Goal: Patient/Family Short Term Goal  Description: Patient's Short Term Goal: Help manage chest pain     Interventions:   - ASA,   - See additional Care Plan goals for specific interventions  Outcome: Progressing

## 2024-04-29 NOTE — CONSULTS
[facility] Cardiology  Consultation Note      Mello Alanis Patient Status:  Inpatient    3/3/1965 MRN [id number]   Location [facility] 2NE-A Attending Isaac Maldonado MD   Hosp Day # 1 PCP Kingsley Manzo MD     Reason for consult: Chest pain    Primary cardiologist: Dimitrios Thakkar MD     History of Present Illness:  Mello Alanis is a 59 year old male with HTN, HLD, h/o CP with coronary CTA  showing moderate proximal LAD stenosis 50-74%, apical LAD FFR abnormal thought rainbow pattern consistent with serial stenoses. He presented to [facility] on 2024 with chest pain.  Central substernal tightness while mowing lawn, radiated to jaw and L arm. Ass/w dizziness and nausea. Resolved with rest. Had a similar episode 2 weeks ago while working in the [employer]. Currently symptom free. There are no reports of dyspnea, palpitations, leg swelling, orthopnea, PND, syncope, claudication, stroke/TIA symptoms, or any outward signs of bleeding.          Medications:  Current Facility-Administered Medications   Medication Dose Route Frequency    venlafaxine ER (Effexor-XR) 24 hr cap 150 mg  150 mg Oral Daily    atorvastatin (Lipitor) tab 40 mg  40 mg Oral Nightly    losartan (Cozaar) tab 50 mg  50 mg Oral Daily    aspirin chewable tab 324 mg  324 mg Oral Once    aspirin tab 325 mg  325 mg Oral Daily    nitroglycerin (Nitrostat) SL tab 0.4 mg  0.4 mg Sublingual Q5 Min PRN    acetaminophen (Tylenol Extra Strength) tab 500 mg  500 mg Oral Q4H PRN    melatonin tab 3 mg  3 mg Oral Nightly PRN    ondansetron (Zofran) 4 MG/2ML injection 4 mg  4 mg Intravenous Q6H PRN    prochlorperazine (Compazine) 10 MG/2ML injection 5 mg  5 mg Intravenous Q8H PRN    heparin (Porcine) 5000 UNIT/ML injection 5,000 Units  5,000 Units Subcutaneous 2 times per day    polyethylene glycol (PEG 3350) (Miralax) 17 g oral packet 17 g  17 g Oral Daily PRN    sennosides (Senokot) tab 17.2 mg  17.2 mg Oral  Nightly PRN    bisacodyl (Dulcolax) 10 MG rectal suppository 10 mg  10 mg Rectal Daily PRN    fleet enema (Fleet) 7-19 GM/118ML rectal enema 133 mL  1 enema Rectal Once PRN       Past Medical History:    Decorative tattoo    Depression    Essential hypertension    Hemorrhoids    High blood pressure    High cholesterol    Hx of motion sickness    Hyperlipidemia    Sleep apnea    CPAP       Past Surgical History:   Procedure Laterality Date    Colonoscopy      Colonoscopy      Other      shoulder rt       Family History  family history includes Breast Cancer in his maternal aunt; Colon Cancer in his maternal aunt; Colon Polyps in his self; Heart Attack in his father; Ovarian Cancer in his maternal aunt; Prostate Cancer in his maternal grandfather; Stroke in his mother.    Social History   reports that he has never smoked. He has never used smokeless tobacco. He reports current alcohol use. He reports that he does not use drugs.     Allergies  No Known Allergies    Review of Systems:  As per HPI, otherwise 10 point ROS is negative in detail.    Physical Exam:  Blood pressure 136/81, pulse 79, temperature 97.8 °F (36.6 °C), temperature source Oral, resp. rate 18, height 70\", weight 249 lb 1.9 oz (113 kg), SpO2 97%.  Temp (24hrs), Av.1 °F (36.7 °C), Min:97.8 °F (36.6 °C), Max:98.2 °F (36.8 °C)    Wt Readings from Last 3 Encounters:   24 249 lb 1.9 oz (113 kg)   23 244 lb (110.7 kg)   23 250 lb (113.4 kg)       General: Awake and alert; in no acute distress  HEENT: Extraocular movements are intact; sclerae are anicteric; scalp is atraumatic  Neck: Supple; no JVD; no carotid bruits  Cardiac: Regular rate and regular rhythm; normal S1 and S2, no murmurs, rubs, or gallops are appreciated  Lungs: Clear to auscultation bilaterally; no accessory muscle use is noted, no wheezes, rhonci or rales  Abdomen: Soft, non-distended, non-tender; bowel sounds are normoactive  Extremities: Warm, no edema, clubbing or  cyanosis; moves all 4 extremities normally, distal pulses intact and equal  Psychiatric: Normal mood and affect; answers questions appropriately  Dermatologic: No rashes; normal skin turgor    Diagnostic testing:    Labs:   Lab Results   Component Value Date    INR 1.05 04/28/2024        Lab Results   Component Value Date    WBC 7.5 04/28/2024    HGB 15.9 04/28/2024    HCT 44.0 04/28/2024    .0 04/28/2024    CREATSERUM 0.93 04/29/2024    BUN 20 04/29/2024     04/29/2024    K 4.1 04/29/2024     04/29/2024    CO2 27.0 04/29/2024     04/29/2024    CA 8.9 04/29/2024    ALB 4.1 04/28/2024    ALKPHO 101 04/28/2024    BILT 0.9 04/28/2024    TP 7.7 04/28/2024    AST 33 04/28/2024    ALT 43 04/28/2024    PTT 28.8 04/28/2024    INR 1.05 04/28/2024    PTP 13.7 04/28/2024    MG 2.4 04/29/2024       Cardiac diagnostics:    EKG 4/28/2024: NSR, normal    CCTA 10/29/21  1. Technically limited study due to moderate image noise and moderate   cardiac motion.   2. There is possibly obstructive (50-74% stenosis) coronary artery disease   involving the mid LAD. There is also heavy calcification of the proximal   LAD and OM2 branch which prevent assessment of angiographic stenosis. An   FFRct Coronary Analysis ([employer]) post-processing study has been   ordered to define the extent of flow-limitation.   3. There is nonobstructive coronary artery disease involving the RCA and   PDA.   4. The visualized thoracic aorta is normal in size.   5. See the radiologist report for over-read of non-vascular structures.     FFR   LAD: 0.99 (proximal LAD); 0.92 (mid LAD); 0.84 (distal LAD); 0.74 (apical   LAD); a rainbow pattern is noted in the apical LAD consistent with serial   nonobstructive lesions   LCx: 0.83 (distal LCx)   RCA: 0.84 (distal RCA)     Impression:  59 year old male admitted with exertional CP  Moderate proximal LAD stenosis from CCTA 10/21  HTN  HLD  FH: Father 4V CABG age  50    Recommendations:  Discussed test results and options for management including medical therapy alone, stress testing, or coronary angiography, either with CT or cardiac catheterization. Given his concerning symptoms, known moderate prox LAD stenosis, we have mutually agreed to proceed with cardiac catheterization for definitive evaluation, and +/- PCI if indicated.  ASA, statin, BP control  Check echo    Thank you for allowing our practice to participate in the care of your patient. Please do not hesitate to contact me if you have any questions.    Dashawn Samuel MD  Interventional Cardiology  [facility]  Office: [phone]    4/29/2024  9:13 AM    Total encounter time 80 minutes.       Informed consent:  Patient seen and examined independently. H and P reviewed. No changes in H and P. Risks and benefits of procedure were discussed with patient. Airway examined.  Patient is ASA class 2 and Mallampati class 2. Pt is appropriate for conscious sedation. No history of difficult airway. The risks, benefits, indications and alternatives of left heart catheterization and coronary angigoraphy with possible percutaneous coronary intervention were discussed. The risks include, but are not limited to: bleeding, anemia requiring blood transfusion, allergic reaction, hypotension, infection, vascular injury, injury to upper or lower extremity requiring endovascular or open surgical repair,  kidney failure, stroke, cardiac or pulmonary artery perforation, pericardial effusion and tamponade requiring pericardiocentesis, myocardial infarction (heart attack), respiratory failure needing intubation, cardiac arrest, need for emergent surgery, and death. Benefits of the procedure include: symptomatic improvement, diagnosis of coronary artery disease or other forms of heart disease and possibly prevention of myocardial infarction. Alternatives to the procedure include: not performing cardiac catheterization, treatment with  medications only, and observation. The patient and any accompanying family have considered the above, all questions have been answered to the best of my ability to their satisfaction, and have decided to proceed with the procedure. Appropriate candidate for Sedation/Analgesia: Yes. Plan for Sedation reviewed: Yes. Explained Anesthesia options and attendant risks, and have determined patient is an appropriate candidate. Yes. Consent for Sedation obtained: Yes. Patient reevaluated immediately prior to Sedation/Analgesia: Yes.

## 2024-04-29 NOTE — ED QUICK NOTES
Orders for admission, patient is aware of plan and ready to go upstairs. Any questions, please call ED RN juan pablo at extension [phone].     Patient Covid vaccination status: Fully vaccinated     COVID Test Ordered in ED: None    COVID Suspicion at Admission: N/A    Running Infusions:  None    Mental Status/LOC at time of transport: a&ox4    Other pertinent information:   CIWA score: N/A   NIH score:  N/A

## 2024-04-30 ENCOUNTER — PATIENT OUTREACH (OUTPATIENT)
Dept: CASE MANAGEMENT | Age: 59
End: 2024-04-30

## 2024-04-30 DIAGNOSIS — Z02.9 ENCOUNTERS FOR UNSPECIFIED ADMINISTRATIVE PURPOSE: Primary | ICD-10-CM

## 2024-04-30 DIAGNOSIS — R07.9 CHEST PAIN OF UNCERTAIN ETIOLOGY: ICD-10-CM

## 2024-04-30 LAB
ATRIAL RATE: 77 BPM
ATRIAL RATE: 80 BPM
P AXIS: 10 DEGREES
P AXIS: 14 DEGREES
P-R INTERVAL: 158 MS
P-R INTERVAL: 162 MS
Q-T INTERVAL: 364 MS
Q-T INTERVAL: 392 MS
QRS DURATION: 92 MS
QRS DURATION: 98 MS
QTC CALCULATION (BEZET): 419 MS
QTC CALCULATION (BEZET): 443 MS
R AXIS: 78 DEGREES
R AXIS: 80 DEGREES
T AXIS: 75 DEGREES
T AXIS: 76 DEGREES
VENTRICULAR RATE: 77 BPM
VENTRICULAR RATE: 80 BPM

## 2024-04-30 NOTE — DISCHARGE SUMMARY
[facility]     Mello Alanis Patient Status:  Inpatient    3/3/1965 MRN [id number]   Location [facility] 2NE-A Attending No att. providers found   Hosp Day # 1 PCP Kingsley Manzo MD     Date of Admission: 2024  Date of Discharge:  2024     Discharge Disposition: Home or Self Care    Discharge Diagnosis:    #Chest pain with typical features  #Essential HTN     #HLD     #Depression   #HERI  #Obesity - BMI 35.74    History of Present Illness: Mello Alanis is a 59 year old male with past medical history essential hypertension, hyperlipidemia, depression, HERI presents to the emergency room with complaints of feeling lightheaded/dizzy and associated chest pain.  He first felt a sensation of lightheadedness last week but it went away on its own.  Patient was out mowing the lawn today he started feeling diaphoretic weak with associated chest pain that radiated up to his jaw.  Stated lasted for a bit and then spontaneously resolved.  He otherwise denies any fevers, cough, chills, nausea, vomiting, diarrhea, constipation.     -S/p aspirin and cardiology consult in the emergency room    Brief Synopsis:   Patient presented with chest pain, typical features.  Seen by cardiology.   Underwent a C,  Findings:   1. Left main: No stenosis  2. LAD: Heavily calcified proximal LAD, but only luminal irregularities < 30%, several small diagonals  3. Left circumflex: luminal irregularities    4. RCA: Dominant to PDA, mild diffuse plaquing ~30%  5. Hemodynamics: LVEDP < 10 mmHg. No LV-Ao gradient on pullback.     Cleared by cardiology for discharge home.  Resume regular home meds and medical management.  F/u with pcp affter discharge.      Lace+ Score: 37  59-90 High Risk  29-58 Medium Risk  0-28   Low Risk       TCM Follow-Up Recommendation:  LACE 29-58: Moderate Risk of readmission after discharge from the hospital.      Procedures during hospitalization:   LHC  4/29    Incidental or significant findings and recommendations (brief descriptions):  Na    Lab/Test results pending at Discharge:   None    Consultants:  Cardiology    Discharge Medication List:     Discharge Medications        CONTINUE taking these medications        Instructions Prescription details   aspirin 81 MG Tbec      Take 1 tablet (81 mg total) by mouth daily.   Refills: 0     atorvastatin 40 MG Tabs  Commonly known as: Lipitor      TAKE 1 TABLET BY MOUTH EVERY DAY IN THE EVENING   Quantity: 90 tablet  Refills: 3     losartan 50 MG Tabs  Commonly known as: Cozaar      Take 1 tablet (50 mg total) by mouth daily.   Quantity: 90 tablet  Refills: 3     venlafaxine  MG Cp24  Commonly known as: Effexor-XR      Take 1 capsule (150 mg total) by mouth daily.   Quantity: 90 capsule  Refills: 3              [place]    Follow-up appointment:   [facility]  [address] [place] [address]  [phone]  Follow up on 4/29/2024      Dimitrios Thakkar MD  [address]  [facility] [address]  [place] [address]  [phone]    Follow up in 3 month(s)  Has an appt with Dimitrios in Sept    Kingsley Manzo MD  [address]  [place] [address]  [phone]    Follow up in 2 day(s)      Appointments for Next 30 Days 4/30/2024 - 5/30/2024      None              -----------------------------------------------------------------------------------------------  PATIENT DISCHARGE INSTRUCTIONS: See electronic chart    Isaac Maldonado MD      The 21st Century Cures Act makes medical notes like these available to patients in the interest of transparency. Please be advised this is a medical document. Medical documents are intended to carry relevant information, facts as evident, and the clinical opinion of the practitioner. The medical note is intended as peer to peer communication and may appear blunt or direct. It is written in medical language and may contain abbreviations  or verbiage that are unfamiliar.

## 2024-04-30 NOTE — PAYOR COMM NOTE
--------------  ADMISSION REVIEW     Payor: [facility]  Subscriber #:  [id number]  Authorization Number: [id number]    Admit date: 4/28/24  Admit time: 11:17 PM       REVIEW DOCUMENTATION:     ED Provider Notes        ED Provider Notes signed by Omar Lopez MD at 4/28/2024  8:49 PM       Author: Omar Lopez MD Service: -- Author Type: Physician    Filed: 4/28/2024  8:49 PM Date of Service: 4/28/2024  8:03 PM Status: Signed    : Omar Lopez MD (Physician)           Patient Seen in: [facility] In [place]      History     Chief Complaint   Patient presents with    Dizziness     Stated Complaint: dizziness    Subjective:   HPI    Patient is a pleasant 59-year-old male with a history of hypertension, hypercholesterolemia who presents with episode of weakness, near syncope.  Today he was mowing the lawn.  Says he has a mower he walks behind.  Started to feel weak.  He got diaphoretic.  Complained of mild chest pressure and some jaw pain.  Says he stopped and went inside.  Felt lightheaded and dizzy.  Symptoms have resolved.  An episode last week where after sitting on the couch she got up.  Felt dizzy and lightheaded as well.  No chest pain.  That lasted over an hour till he started feeling better.  He has no known coronary disease.  He had a negative CTA in the past.  He did have a high calcium score.  Wife is at bedside and helps with her history.    Objective:   Past Medical History:    Decorative tattoo    Depression    Essential hypertension    Hemorrhoids    High blood pressure    High cholesterol    Hx of motion sickness    Hyperlipidemia    Sleep apnea    CPAP              Past Surgical History:   Procedure Laterality Date    Colonoscopy      Colonoscopy      Other      shoulder rt                Social History     Socioeconomic History    Marital status:    Tobacco Use    Smoking status: Never    Smokeless tobacco: Never   Vaping Use    Vaping status: Never Used    Substance and Sexual Activity    Alcohol use: Yes     Comment: Social Drinker    Drug use: No   Other Topics Concern    Caffeine Concern Yes     Comment: more than 5 cups per day    Exercise Yes     Comment: 1x per week    Seat Belt Yes              Review of Systems    Positive for stated complaint: dizziness  Other systems are as noted in HPI.  Constitutional and vital signs reviewed.      All other systems reviewed and negative except as noted above.    Physical Exam     ED Triage Vitals [04/28/24 1934]   BP (!) 162/80   Pulse 89   Resp 22   Temp    Temp src    SpO2 99 %   O2 Device None (Room air)       Current:BP (!) 162/80   Pulse 89   Resp 22   Ht 177.8 cm (5' 10\")   Wt 113.4 kg   SpO2 99%   BMI 35.87 kg/m²         Physical Exam    General: Patient is resting comfortably in no acute distress  HEENT: Normal cephalic atraumatic.  Nonicteric sclera.  Moist mucous membranes.  No meningismus.  No adenopathy  Lungs: No tachypnea.  Lungs clear to auscultation bilaterally without rales/rhonchi.  Equal breath sounds bilaterally  Cardiac: No tachycardia.  No murmurs.  Regular rate and rhythm.  Abdomen: Soft and nontender throughout.  No rebound or guarding  Extremities: No clubbing/cyanosis/edema.  Skin: No rashes, no pallor  Neuro: Awake oriented ×3.  Nonfocal.  Good strength throughout    ED Course     Labs Reviewed   PROTHROMBIN TIME (PT) - Normal   PTT, ACTIVATED - Normal   CBC WITH DIFFERENTIAL WITH PLATELET    Narrative:     The following orders were created for panel order CBC With Differential With Platelet.  Procedure                               Abnormality         Status                     ---------                               -----------         ------                     CBC W/ DIFFERENTIAL[[id number]]                              Final result                 Please view results for these tests on the individual orders.   COMP METABOLIC PANEL (14)   TROPONIN I HIGH SENSITIVITY   CBC W/  DIFFERENTIAL     EKG    Rate, intervals and axes as noted on EKG Report.  Rate: 80  Rhythm: Sinus Rhythm  Reading: Normal sinus rhythm.  No acute ST-T wave changes.  Axis/intervals are noted.  Otherwise, agree with EKG report              Heart Score:    HEART Score      Title      Criteria Score   Age: 45-64 Age Score: 1   History: Highly Suspicious Hx Score: 2     EKG: Normal EKG Score: 0   HTN: Yes   Hypercholesterolemia: Yes   Atherosclerosis/PVD: No     DM: No   BMI>30kg/m2: Yes   Smoking: No   Family History: No         Other Risk Factor Score: 3             No results found for: \"TROP\", \"TROPHS\"        HEART Score: 5        Risk of adverse cardiac event is 12-16.6%              Chest x-ray: I personally reviewed the films and my independent interpertaion showed no acute pathology.  Official report reviewed      Blood work reviewed.  Troponin negative.  MDM      Patient is a 59-year-old male with a history of hyperlipidemia, hypertension, high calcium score presents with 2 suspicious episodes.  Last 1 today when he was mowing the lawn.  Diaphoresis with mild chest tightness and some jaw discomfort.  Resolved with rest.  No clear EKG changes.  Will check troponin.  Was given baby aspirin.    Patient's heart score is 5.  Will admit to the hospital for serial troponins, cardiology evaluation.  Has seen duly cardiology in the past.    Patient was given aspirin.  [facility]  Admit for serial enzymes, further testing.                           Medical Decision Making      Disposition and Plan     Clinical Impression:  1. Chest pain of uncertain etiology         Disposition:  There is no disposition on file for this visit.  There is no disposition time on file for this visit.    Follow-up:  No follow-up provider specified.        Medications Prescribed:  Current Discharge Medication List                                           Signed by Omar Lopez MD on  4/28/2024  8:49 PM         MEDICATIONS ADMINISTERED IN LAST 1 DAY:  iohexol (OMNIPAQUE) 350 MG/ML injection 150 mL       Date Action Dose Route User    Discharged on 4/29/2024 4/29/2024 1129 Given 150 mL Injection Dashawn Samuel MD            Vitals (last day) before discharge       Date/Time Temp Pulse Resp BP SpO2 Weight O2 Device O2 Flow Rate (L/min) [facility]    04/29/24 1542 97.9 °F (36.6 °C) 68 18 117/76 96 % -- None (Room air) -- NC    04/29/24 0829 97.8 °F (36.6 °C) 79 18 136/81 97 % -- None (Room air) -- NC    04/29/24 0633 98.2 °F (36.8 °C) 74 24 127/83 96 % -- None (Room air) -- MV    04/28/24 2342 -- 93 -- 112/74 -- -- -- -- MV    04/28/24 2340 -- 83 -- 129/75 -- -- -- -- MV    04/28/24 2338 -- 80 -- 135/83 -- 249 lb 1.9 oz -- -- MV    04/28/24 2331 -- -- -- -- -- 249 lb 1.9 oz -- --     04/28/24 2328 98.2 °F (36.8 °C) -- -- 151/78 95 % -- None (Room air) --     04/28/24 2242 -- 77 18 129/73 97 % -- None (Room air) --     04/28/24 2141 -- 78 20 127/72 96 % -- None (Room air) --     04/28/24 2029 -- 81 17 134/72 96 % -- None (Room air) --     04/28/24 1934 -- 89 22 162/80 99 % 250 lb None (Room air) -- MR       Reason for consult: Chest pain     Primary cardiologist: Dimitrios Thakkar MD      History of Present Illness:  Mello Alanis is a 59 year old male with HTN, HLD, h/o CP with coronary CTA 2021 showing moderate proximal LAD stenosis 50-74%, apical LAD FFR abnormal thought rainbow pattern consistent with serial stenoses. He [facility] to [facility] on [facility]/28/[facility]24 with chest pain.  Central substernal tightness while mowing lawn, radiated to jaw and L arm. Ass/w dizziness and nausea. Resolved with rest. Had a similar episode 2 weeks ago while working in the garage. Currently symptom free. There are no reports of dyspnea, palpitations, leg swelling, orthopnea, PND, syncope, claudication, stroke/TIA symptoms, or any outward signs of bleeding.           Medications:  Current Hospital  Medications          Current Facility-Administered Medications   Medication Dose Route Frequency    venlafaxine ER (Effexor-XR) 24 hr cap 150 mg  150 mg Oral Daily    atorvastatin (Lipitor) tab 40 mg  40 mg Oral Nightly    losartan (Cozaar) tab 50 mg  50 mg Oral Daily    aspirin chewable tab 324 mg  324 mg Oral Once    aspirin tab 325 mg  325 mg Oral Daily    nitroglycerin (Nitrostat) SL tab 0.4 mg  0.4 mg Sublingual Q5 Min PRN    acetaminophen (Tylenol Extra Strength) tab 500 mg  500 mg Oral Q4H PRN    melatonin tab 3 mg  3 mg Oral Nightly PRN    ondansetron (Zofran) 4 MG/2ML injection 4 mg  4 mg Intravenous Q6H PRN    prochlorperazine (Compazine) 10 MG/2ML injection 5 mg  5 mg Intravenous Q8H PRN    heparin (Porcine) 5000 UNIT/ML injection 5,000 Units  5,000 Units Subcutaneous 2 times per day    polyethylene glycol (PEG 3350) (Miralax) 17 g oral packet 17 g  17 g Oral Daily PRN    sennosides (Senokot) tab 17.2 mg  17.2 mg Oral Nightly PRN    bisacodyl (Dulcolax) 10 MG rectal suppository 10 mg  10 mg Rectal Daily PRN    fleet enema (Fleet) 7-19 GM/118ML rectal enema 133 mL  1 enema Rectal Once PRN            Past Medical History       Past Medical History:    Decorative tattoo    Depression    Essential hypertension    Hemorrhoids    High blood pressure    High cholesterol    Hx of motion sickness    Hyperlipidemia    Sleep apnea     CPAP            Past Surgical History         Past Surgical History:   Procedure Laterality Date    Colonoscopy        Colonoscopy        Other         shoulder rt            Family History  family history includes Breast Cancer in his maternal aunt; Colon Cancer in his maternal aunt; Colon Polyps in his self; Heart Attack in his father; Ovarian Cancer in his maternal aunt; Prostate Cancer in his maternal grandfather; Stroke in his mother.     Social History   reports that he has never smoked. He has never used smokeless tobacco. He reports current alcohol use. He reports that he does  not use drugs.      Allergies  Allergies   No Known Allergies        Review of Systems:  As per HPI, otherwise 10 point ROS is negative in detail.     Physical Exam:  Blood pressure 136/81, pulse 79, temperature 97.8 °F (36.6 °C), temperature source Oral, resp. rate 18, height 70\", weight 249 lb 1.9 oz (113 kg), SpO2 97%.  Temp (24hrs), Av.1 °F (36.7 °C), Min:97.8 °F (36.6 °C), Max:98.2 °F (36.8 °C)         Wt Readings from Last 3 Encounters:   24 249 lb 1.9 oz (113 kg)   23 244 lb (110.7 kg)   23 250 lb (113.4 kg)         General: Awake and alert; in no acute distress  HEENT: Extraocular movements are intact; sclerae are anicteric; scalp is atraumatic  Neck: Supple; no JVD; no carotid bruits  Cardiac: Regular rate and regular rhythm; normal S1 and S2, no murmurs, rubs, or gallops are appreciated  Lungs: Clear to auscultation bilaterally; no accessory muscle use is noted, no wheezes, rhonci or rales  Abdomen: Soft, non-distended, non-tender; bowel sounds are normoactive  Extremities: Warm, no edema, clubbing or cyanosis; moves all 4 extremities normally, distal pulses intact and equal  Psychiatric: Normal mood and affect; answers questions appropriately  Dermatologic: No rashes; normal skin turgor     Diagnostic testing:     Labs:         Lab Results   Component Value Date     INR 1.05 2024            Lab Results   Component Value Date     WBC 7.5 2024     HGB 15.9 2024     HCT 44.0 2024     .0 2024     CREATSERUM 0.93 2024     BUN 20 2024      2024     K 4.1 2024      2024     CO2 27.0 2024      2024     CA 8.9 2024     ALB 4.1 2024     ALKPHO 101 2024     BILT 0.9 2024     TP 7.7 2024     AST 33 2024     ALT 43 2024     PTT 28.8 2024     INR 1.05 2024     PTP 13.7 2024     MG 2.4 2024         Cardiac diagnostics:     EKG 2024:  NSR, normal     CCTA 10/29/21  1. Technically limited study due to moderate image noise and moderate   cardiac motion.   2. There is possibly obstructive (50-74% stenosis) coronary artery disease   involving the mid LAD. There is also heavy calcification of the proximal   LAD and OM2 branch which prevent assessment of angiographic stenosis. An   FFRct Coronary Analysis ([employer]) post-processing study has been   ordered to define the extent of flow-limitation.   3. There is nonobstructive coronary artery disease involving the RCA and   PDA.   4. The visualized thoracic aorta is normal in size.   5. See the radiologist report for over-read of non-vascular structures.      FFR   LAD: 0.99 (proximal LAD); 0.92 (mid LAD); 0.84 (distal LAD); 0.74 (apical   LAD); a rainbow pattern is noted in the apical LAD consistent with serial   nonobstructive lesions   LCx: 0.83 (distal LCx)   RCA: 0.84 (distal RCA)      Impression:  59 year old male admitted with exertional CP  Moderate proximal LAD stenosis from CCTA 10/21  HTN  HLD  FH: Father 4V CABG age 50     Recommendations:  Discussed test results and options for management including medical therapy alone, stress testing, or coronary angiography, either with CT or cardiac catheterization. Given his concerning symptoms, known moderate prox LAD stenosis, we have mutually agreed to proceed with cardiac catheterization for definitive evaluation, and +/- PCI if indicated.  ASA, statin, BP control  Check echo     Thank you for allowing our practice to participate in the care of your patient. Please do not hesitate to contact me if you have any questions.     Dashawn Samuel MD  Interventional Cardiology  [facility]  Office: [phone]     4/29/2024  9:13 AM     Total encounter time 80 minutes.         Informed consent:  Patient seen and examined independently. H and P reviewed. No changes in H and P. Risks and benefits of procedure were discussed with patient. Airway  examined.  Patient is ASA class 2 and Mallampati class 2. Pt is appropriate for conscious sedation. No history of difficult airway. The risks, benefits, indications and alternatives of left heart catheterization and coronary angigoraphy with possible percutaneous coronary intervention were discussed. The risks include, but are not limited to: bleeding, anemia requiring blood transfusion, allergic reaction, hypotension, infection, vascular injury, injury to upper or lower extremity requiring endovascular or open surgical repair,  kidney failure, stroke, cardiac or pulmonary artery perforation, pericardial effusion and tamponade requiring pericardiocentesis, myocardial infarction (heart attack), respiratory failure needing intubation, cardiac arrest, need for emergent surgery, and death. Benefits of the procedure include: symptomatic improvement, diagnosis of coronary artery disease or other forms of heart disease and possibly prevention of myocardial infarction. Alternatives to the procedure include: not performing cardiac catheterization, treatment with medications only, and observation. The patient and any accompanying family have considered the above, all questions have been answered to the best of my ability to their satisfaction, and have decided to proceed with the procedure. Appropriate candidate for Sedation/Analgesia: Yes. Plan for Sedation reviewed: Yes. Explained Anesthesia options and attendant risks, and have determined patient is an appropriate candidate. Yes. Consent for Sedation obtained: Yes. Patient reevaluated immediately prior to Sedation/Analgesia: Yes.                   Electronically signed by Dashawn Samuel MD at 4/29/2024  9:20 AM

## 2024-04-30 NOTE — PAYOR COMM NOTE
--------------  DISCHARGE REVIEW    Payor: [facility]  Subscriber #:  [id number]  Authorization Number: [id number]    Admit date: 24  Admit time:  11:17 PM  Discharge Date: 2024  7:06 PM     Admitting Physician: Alejo Oneal MD  Attending Physician:  No att. providers found  Primary Care Physician: Kingsley Manzo MD          Discharge Summary Notes        Discharge Summary signed by Isaac Maldonado MD at 2024  7:40 AM       Author: Isaac Maldonado MD Specialty: HOSPITALIST Author Type: Physician    Filed: 2024  7:40 AM Date of Service: 2024  7:36 AM Status: Signed    : Isaac Maldonado MD (Physician)           [facility]     Mello Alanis Patient Status:  Inpatient    3/3/1965 MRN [id number]   Location [facility] 2NE-A Attending No att. providers found   Hosp Day # 1 PCP Kingsley Manzo MD     Date of Admission: 2024  Date of Discharge:  2024     Discharge Disposition: Home or Self Care    Discharge Diagnosis:    #Chest pain with typical features  #Essential HTN     #HLD     #Depression   #HERI  #Obesity - BMI 35.74    History of Present Illness: Mello Alanis is a 59 year old male with past medical history essential hypertension, hyperlipidemia, depression, HERI presents to the emergency room with complaints of feeling lightheaded/dizzy and associated chest pain.  He first felt a sensation of lightheadedness last week but it went away on its own.  Patient was out mowing the lawn today he started feeling diaphoretic weak with associated chest pain that radiated up to his jaw.  Stated lasted for a bit and then spontaneously resolved.  He otherwise denies any fevers, cough, chills, nausea, vomiting, diarrhea, constipation.     -S/p aspirin and cardiology consult in the emergency room    Brief Synopsis:   Patient presented with chest pain, typical features.  Seen by cardiology.   Underwent a LHC,  Findings:   1. Left main: No  stenosis  2. LAD: Heavily calcified proximal LAD, but only luminal irregularities < 30%, several small diagonals  3. Left circumflex: luminal irregularities    4. RCA: Dominant to PDA, mild diffuse plaquing ~30%  5. Hemodynamics: LVEDP < 10 mmHg. No LV-Ao gradient on pullback.     Cleared by cardiology for discharge home.  Resume regular home meds and medical management.  F/u with pcp affter discharge.      Lace+ Score: 37  59-90 High Risk  29-58 Medium Risk  0-28   Low Risk       TCM Follow-Up Recommendation:  LACE 29-58: Moderate Risk of readmission after discharge from the hospital.      Procedures during hospitalization:   [facility] 4/29    Incidental or significant findings and recommendations (brief descriptions):  Na    Lab/Test results pending at Discharge:   None    Consultants:  Cardiology    Discharge Medication List:     Discharge Medications        CONTINUE taking these medications        Instructions Prescription details   aspirin 81 MG Tbec      Take 1 tablet (81 mg total) by mouth daily.   Refills: 0     atorvastatin 40 MG Tabs  Commonly known as: Lipitor      TAKE 1 TABLET BY MOUTH EVERY DAY IN THE EVENING   Quantity: 90 tablet  Refills: 3     losartan 50 MG Tabs  Commonly known as: Cozaar      Take 1 tablet (50 mg total) by mouth daily.   Quantity: 90 tablet  Refills: 3     venlafaxine  MG Cp24  Commonly known as: Effexor-XR      Take 1 capsule (150 mg total) by mouth daily.   Quantity: 90 capsule  Refills: 3              [place]    Follow-up appointment:   [facility]  [address] [facility] [address] [phone]  Follow up on 4/29/2024      Dimitrios Thakkar MD  [address]  [facility] [address]  [facility] [address] [phone]    Follow up in 3 month(s)  Has an appt with Dimitrios in Sept    Kingsley Manzo MD  [address]  [facility] [address] [phone]    Follow up in 2 day(s)      Appointments for Next 30 Days 4/30/2024 -  5/30/2024      None              -----------------------------------------------------------------------------------------------  PATIENT DISCHARGE INSTRUCTIONS: See electronic chart    Isaac Maldonado MD      The 21st Century Cures Act makes medical notes like these available to patients in the interest of transparency. Please be advised this is a medical document. Medical documents are intended to carry relevant information, facts as evident, and the clinical opinion of the practitioner. The medical note is intended as peer to peer communication and may appear blunt or direct. It is written in medical language and may contain abbreviations or verbiage that are unfamiliar.       Electronically signed by Isaac Maldonado MD on 4/30/2024  7:40 AM         REVIEWER COMMENTS

## 2024-04-30 NOTE — PROGRESS NOTES
Initial Post Discharge Follow Up   Discharge Date: 4/29/24  Contact Date: 4/30/2024    Consent Verification:  Assessment Completed With: Patient  HIPAA Verified?  Yes    Discharge Dx:   #Chest pain with typical features  #Essential HTN     #HLD     #Depression   #HERI  #Obesity - BMI 35.74    General:   How have you been since your discharge from the hospital? NCM spoke with pt states he is feeling fine. Pt denies any shortness of breath, chest pain, fevers, chills, nausea, vomiting, or any other symptoms at this time. Pt reports feeling tired.    Do you have any pain since discharge?  No    How well was your pain managed while in the hospital?   On a scale of 1-5   1- Very Poor and 5- Very well   5  When you were leaving the hospital were your discharge instructions reviewed with you? Yes  How well were your discharge instructions explained to you?   On a scale of 1-5   1- Very Poor and 5- Very well   5  Do you have any questions about your discharge instructions?  No  Before leaving the hospital was your diagnoses explained to you? Yes  Do you have any questions about your diagnoses? No  Are you able to perform normal daily activities of living as you have prior to your hospital stay (dressing, bathing, ambulating to the bathroom, etc)? yes  (NCM) Was patient given a different diet per AVS? no      Medications: Reviewed medication list.  Medications are up to date.  Current Outpatient Medications   Medication Sig Dispense Refill    aspirin 81 MG Oral Tab EC Take 1 tablet (81 mg total) by mouth daily.  0    venlafaxine  MG Oral Capsule SR 24 Hr Take 1 capsule (150 mg total) by mouth daily. 90 capsule 3    ATORVASTATIN 40 MG Oral Tab TAKE 1 TABLET BY MOUTH EVERY DAY IN THE EVENING 90 tablet 3    losartan Potassium 50 MG Oral Tab Take 1 tablet (50 mg total) by mouth daily. 90 tablet 3     Were there any changes to your current medication(s) noted on the AVS? No  Let's go over your medications together to make  sure we are not missing anything. Medications Reviewed  Are there any reasons that keep you from taking your medication as prescribed? No  Are you having any concerns with constipation? No      Discharge medications reviewed/discussed/and reconciled against outpatient medications with patient.  Any changes or updates to medications sent to PCP.  Patient Acknowledged     Referrals/orders at D/C:  Referrals/orders placed at D/C? no    DME ordered at D/C? No      Discharge orders, AVS reviewed and discussed with patient. Any changes or updates to orders sent to PCP.  Patient Acknowledged      SDOH:   Transportation Needs: No Transportation Needs (4/28/2024)    Transportation Needs     Lack of Transportation: No     Financial Resource Strain: Low Risk  (4/30/2024)    Financial Resource Strain     Difficulty of Paying Living Expenses: Not hard at all     Med Affordability: Not on file         Follow up appointments:      Your appointments       Date & Time Appointment Department ([place])    May 29, 2024 2:00 PM CDT Follow Up Visit with Kingsley Manzo MD [facility] ([facility])    Contact your primary care provider if your insurance requires a referral.    Please arrive 15 minutes prior to your scheduled appointment. Be sure to bring your current Insurance card, Photo ID, and medication bottles or a list of your current medications.      A 24 hour notice is required to cancel any appointment or you may be charged a $40 No Show Fee.     Important: 24 hour notice is required to cancel any appointment or you may be charged a $40 No Show Fee. Please notify your physician office.               [facility], [facility] [place]  [address]  [facility] [id number] [phone]            TCC  Was TCC ordered: No      PCP (If no TCC appointment)  Does patient already have a PCP appointment scheduled?  Yes  NCM Scheduled PCP office TCM appointment with patient       Specialist    Does the patient have any other follow up appointment(s) needing to be scheduled? Yes  If yes: NCM reviewed upcoming specialist appointment with patient: Yes  Does the patient need assistance scheduling appointment(s): No    Is there any reason as to why you cannot make your appointment(s)?  No     Needs post D/C:   Now that you are home, are there any needs or concerns you need addressed before your next visit with your PCP?  (DME, meds, questions, etc.): No    Interventions by NCM:   All discharge instructions reviewed with the patient. Reviewed when to call MD vs when to call 911 or go the ED. Educated patient on the importance of taking all meds as prescribed as well as close f/u with PCP/specialists. Pt verbalized understanding and will contact the office with any further questions or concerns. Patient denies fevers, chills, nausea, vomiting, shortness of breath, chest pain, or any other symptoms at this time.  NCM scheduled TCM appointment with PCP. NCM provided contact information for any further questions/concerns. Patient verbalized understanding and agreeable.       Overall Rating:   How would you rate the care you received while in the hospital? good    CCM referral placed:    No    BOOK BY DATE: 5/13/24

## 2024-05-09 ENCOUNTER — OFFICE VISIT (OUTPATIENT)
Dept: FAMILY MEDICINE CLINIC | Facility: CLINIC | Age: 59
End: 2024-05-09
Payer: COMMERCIAL

## 2024-05-09 VITALS
DIASTOLIC BLOOD PRESSURE: 76 MMHG | BODY MASS INDEX: 35.7 KG/M2 | HEIGHT: 70 IN | WEIGHT: 249.38 LBS | RESPIRATION RATE: 14 BRPM | SYSTOLIC BLOOD PRESSURE: 130 MMHG | HEART RATE: 74 BPM

## 2024-05-09 DIAGNOSIS — I51.5 CARDIAC CALCIFICATION (HCC): ICD-10-CM

## 2024-05-09 DIAGNOSIS — E78.2 MIXED HYPERLIPIDEMIA: ICD-10-CM

## 2024-05-09 DIAGNOSIS — I10 ESSENTIAL HYPERTENSION: ICD-10-CM

## 2024-05-09 DIAGNOSIS — Z23 NEED FOR VACCINATION: ICD-10-CM

## 2024-05-09 DIAGNOSIS — F33.42 MAJOR DEPRESSION, RECURRENT, FULL REMISSION (HCC): ICD-10-CM

## 2024-05-09 DIAGNOSIS — R07.9 CHEST PAIN OF UNCERTAIN ETIOLOGY: Primary | ICD-10-CM

## 2024-05-09 PROBLEM — I77.819 AORTIC DILATATION: Status: ACTIVE | Noted: 2024-05-09

## 2024-05-09 PROBLEM — I77.819 AORTIC DILATATION (HCC): Status: ACTIVE | Noted: 2024-05-09

## 2024-05-09 PROCEDURE — 90471 IMMUNIZATION ADMIN: CPT | Performed by: FAMILY MEDICINE

## 2024-05-09 PROCEDURE — 99495 TRANSJ CARE MGMT MOD F2F 14D: CPT | Performed by: FAMILY MEDICINE

## 2024-05-09 PROCEDURE — 3075F SYST BP GE 130 - 139MM HG: CPT | Performed by: FAMILY MEDICINE

## 2024-05-09 PROCEDURE — 90750 HZV VACC RECOMBINANT IM: CPT | Performed by: FAMILY MEDICINE

## 2024-05-09 PROCEDURE — 3078F DIAST BP <80 MM HG: CPT | Performed by: FAMILY MEDICINE

## 2024-05-09 PROCEDURE — 3008F BODY MASS INDEX DOCD: CPT | Performed by: FAMILY MEDICINE

## 2024-05-09 NOTE — ASSESSMENT & PLAN NOTE
Cholesterol shows Good control. Long term heart-healthy diet and lifestyle discussed and encouraged to reduce risk of cardiovascular disease.  Cholesterol: 95, done on 4/28/2024.  HDL Cholesterol: 33, done on 4/28/2024.  TriGlycerides 342, done on 4/28/2024.  LDL Cholesterol: 14, done on 4/28/2024.   Cholesterol medications include ATORVASTATIN 40 MG Oral Tab [[id number]].

## 2024-05-09 NOTE — ASSESSMENT & PLAN NOTE
Stable, continue present management and continue to monitor for progression source of overnight hospital stay.

## 2024-05-09 NOTE — ASSESSMENT & PLAN NOTE
BP shows good control with last BP of 117/76. Continue lifestyle changes, diet, exercise and weight loss.   Last K was 4.1 done on 4/29/2024.  Last Cr was 0.93 done on 4/29/2024.  Last eGFR was 93 on 4/29/2024.  BP Meds: losartan Tabs - 50 MG  stable, continue present management and continue to monitor for progression

## 2024-05-09 NOTE — PROGRESS NOTES
Subjective:   Mello Alanis is a 59 year old male who presents for hospital follow up.   He was discharged from [facility] to Home or Self Care  Admission Date: 4/28/24   Discharge Date: 4/29/24  Hospital Discharge Diagnosis: chest pain    Interactive contact within 2 business days post discharge first initiated on Date: 4/30/2024    During the visit, the following was completed:  Obtained and reviewed discharge summary, continuity of care documents, Hospitalist notes, and Cardiology notes  Reviewed Labs (CBC, CMP), Labs (Cardiac markers), and EKG    HPI: presents to the emergency room with complaints of feeling lightheaded/dizzy and associated chest pain. He first felt a sensation of lightheadedness last week but it went away on its own. Patient was out mowing the lawn today he started feeling diaphoretic weak with associated chest pain that radiated up to his jaw. Stated lasted for a bit and then spontaneously resolved. He otherwise denies any fevers, cough, chills, nausea, vomiting, diarrhea, constipation.   Underwent a LHC,  Findings:   1. Left main: No stenosis  2. LAD: Heavily calcified proximal LAD, but only luminal irregularities < 30%, several small diagonals  3. Left circumflex: luminal irregularities    4. RCA: Dominant to PDA, mild diffuse plaquing ~30%  5. Hemodynamics: LVEDP < 10 mmHg. No LV-Ao gradient on pullback.     Cath with mild <30% occlusion with normal work up. Echo wotu AOrtic dilation , nl valvle    History/Other:   Current Medications:  Medication Reconciliation:  I am aware of an inpatient discharge within the last 30 days.  The discharge medication list has been reconciled with the patient's current medication list and reviewed by me.  See medication list for additions of new medication, and changes to current doses of medications and discontinued medications.  Outpatient Medications Marked as Taking for the 5/9/24 encounter (Office Visit) with Kingsley Manzo MD   Medication Sig     aspirin 81 MG Oral Tab EC Take 1 tablet (81 mg total) by mouth daily.    venlafaxine  MG Oral Capsule SR 24 Hr Take 1 capsule (150 mg total) by mouth daily.    ATORVASTATIN 40 MG Oral Tab TAKE 1 TABLET BY MOUTH EVERY DAY IN THE EVENING    losartan Potassium 50 MG Oral Tab Take 1 tablet (50 mg total) by mouth daily.       Review of Systems   Constitutional: Negative.  Negative for activity change, appetite change, chills and fever.   HENT: Negative.     Eyes: Negative.    Respiratory: Negative.  Negative for shortness of breath.    Cardiovascular: Negative.  Negative for chest pain and palpitations.   Gastrointestinal: Negative.  Negative for abdominal pain.   Genitourinary: Negative.  Negative for dysuria.   Musculoskeletal:  Negative for arthralgias.   Skin: Negative.  Negative for rash.   Allergic/Immunologic: Negative.    Neurological: Negative.        Objective:   Physical Exam  Vitals and nursing note reviewed.   Constitutional:       General: He is not in acute distress.     Appearance: Normal appearance. He is well-developed.   HENT:      Head: Normocephalic and atraumatic.   Cardiovascular:      Rate and Rhythm: Normal rate and regular rhythm.      Pulses:           Posterior tibial pulses are 2+ on the right side and 2+ on the left side.      Heart sounds: Normal heart sounds. No murmur heard.  Pulmonary:      Effort: Pulmonary effort is normal. No respiratory distress.      Breath sounds: Normal breath sounds. No wheezing.   Abdominal:      General: Bowel sounds are normal.      Palpations: Abdomen is soft.      Tenderness: There is no abdominal tenderness.   Musculoskeletal:         General: Normal range of motion.      Cervical back: Normal range of motion.      Right lower leg: No edema.      Left lower leg: No edema.   Skin:     General: Skin is warm and dry.      Findings: No rash.   Neurological:      Mental Status: He is alert and oriented to person, place, and time.   Psychiatric:          Mood and Affect: Mood normal.         Behavior: Behavior normal.         Thought Content: Thought content normal.         Judgment: Judgment normal.       /76   Pulse 74   Resp 14   Ht 5' 10\" (1.778 m)   Wt 249 lb 6.4 oz (113.1 kg)   BMI 35.79 kg/m²  Estimated body mass index is 35.79 kg/m² as calculated from the following:    Height as of this encounter: 5' 10\" (1.778 m).    Weight as of this encounter: 249 lb 6.4 oz (113.1 kg).    Assessment & Plan:   1. Chest pain of uncertain etiology (Primary)  Assessment & Plan:  Stable, continue present management and continue to monitor for progression source of overnight hospital stay.   2. Essential hypertension  Overview:  Ramipril 5  Assessment & Plan:  BP shows good control with last BP of 117/76. Continue lifestyle changes, diet, exercise and weight loss.   Last K was 4.1 done on 4/29/2024.  Last Cr was 0.93 done on 4/29/2024.  Last eGFR was 93 on 4/29/2024.  BP Meds: losartan Tabs - 50 MG  stable, continue present management and continue to monitor for progression   3. Major depression, recurrent, full remission (HCC)  Overview:  Effexor in 2011 and restarted 11/18/2020 at 150 mg luis  Assessment & Plan:  Clinical Course: stable   Good control  Antidepressant Meds: venlafaxine ER Cp24 - 150 MG   4. Mixed hyperlipidemia  Overview:  HDL 36, trigs 160,  but abnormal EBCT, started atorvastatin 40 5/6/2016   Assessment & Plan:  Cholesterol shows Good control. Long term heart-healthy diet and lifestyle discussed and encouraged to reduce risk of cardiovascular disease.  Cholesterol: 95, done on 4/28/2024.  HDL Cholesterol: 33, done on 4/28/2024.  TriGlycerides 342, done on 4/28/2024.  LDL Cholesterol: 14, done on 4/28/2024.   Cholesterol medications include ATORVASTATIN 40 MG Oral Tab [[id number]].     5. Need for vaccination  -     Shingrix 1st Dose (Today)  -     Shingrix Vaccine 2nd Dose; Future; Expected date: 07/09/2024  6. Cardiac calcification  (HCC)  Overview:  CV= Dr Thakkar  Assessment & Plan:  Stable, continue present management and continue to monitor for progression         Return in about 6 months (around 11/9/2024) for recheck.

## 2024-06-06 DIAGNOSIS — F33.42 MAJOR DEPRESSION, RECURRENT, FULL REMISSION (HCC): ICD-10-CM

## 2024-06-07 RX ORDER — VENLAFAXINE HYDROCHLORIDE 150 MG/1
150 CAPSULE, EXTENDED RELEASE ORAL DAILY
Qty: 90 CAPSULE | Refills: 1 | Status: SHIPPED | OUTPATIENT
Start: 2024-06-07

## 2024-06-07 NOTE — TELEPHONE ENCOUNTER
Requested Prescriptions     Pending Prescriptions Disp Refills    VENLAFAXINE  MG Oral Capsule SR 24 Hr [Pharmacy Med Name: VENLAFAXINE HCL  MG CAP] 90 capsule 3     Sig: TAKE 1 CAPSULE BY MOUTH EVERY DAY     LOV 5/9/2024     Patient was asked to follow-up in: 6 months    Appointment scheduled: 7/10/2024 Kingsley Manzo MD     Medication refilled per protocol.

## 2024-07-09 NOTE — ASSESSMENT & PLAN NOTE
BP shows good control with last BP of 130/76. Continue lifestyle changes, diet, exercise and weight loss.   Last K was 4.1 done on 4/29/2024.  Last Cr was 0.93 done on 4/29/2024.  Last eGFR was 93 on 4/29/2024.  BP Meds: losartan Tabs - 50 MG

## 2024-07-09 NOTE — ASSESSMENT & PLAN NOTE
Cholesterol shows Good control. Long term heart-healthy diet and lifestyle discussed and encouraged to reduce risk of cardiovascular disease.  Cholesterol: 95, done on 4/28/2024.  HDL Cholesterol: 33, done on 4/28/2024.  TriGlycerides 342, done on 4/28/2024.  LDL Cholesterol: 14, done on 4/28/2024.   Cholesterol medications include ATORVASTATIN 40 MG Oral Tab [[id number]].

## 2024-07-10 ENCOUNTER — OFFICE VISIT (OUTPATIENT)
Dept: FAMILY MEDICINE CLINIC | Facility: CLINIC | Age: 59
End: 2024-07-10
Payer: COMMERCIAL

## 2024-07-10 VITALS
DIASTOLIC BLOOD PRESSURE: 70 MMHG | HEART RATE: 70 BPM | RESPIRATION RATE: 16 BRPM | HEIGHT: 70 IN | WEIGHT: 243.19 LBS | SYSTOLIC BLOOD PRESSURE: 134 MMHG | BODY MASS INDEX: 34.82 KG/M2

## 2024-07-10 DIAGNOSIS — F33.42 MAJOR DEPRESSION, RECURRENT, FULL REMISSION (HCC): ICD-10-CM

## 2024-07-10 DIAGNOSIS — E78.2 MIXED HYPERLIPIDEMIA: ICD-10-CM

## 2024-07-10 DIAGNOSIS — Z12.5 SCREENING FOR PROSTATE CANCER: ICD-10-CM

## 2024-07-10 DIAGNOSIS — Z23 NEED FOR VACCINATION: ICD-10-CM

## 2024-07-10 DIAGNOSIS — I10 ESSENTIAL HYPERTENSION: Primary | ICD-10-CM

## 2024-07-10 DIAGNOSIS — Z00.00 LABORATORY EXAMINATION ORDERED AS PART OF A ROUTINE GENERAL MEDICAL EXAMINATION: ICD-10-CM

## 2024-07-10 NOTE — PROGRESS NOTES
Mello is a 59 year old male coming in for had concerns including Immunization/Injection (2nd shingles ).    Subjective:   HPI   Stbal labs. Wanr shingrix. Stble htn    Objective:   /70   Pulse 70   Resp 16   Ht 5' 10\" (1.778 m)   Wt 243 lb 3.2 oz (110.3 kg)   BMI 34.90 kg/m²  Body mass index is 34.9 kg/m².   Physical Exam  Constitutional:       Appearance: He is well-developed.   HENT:      Head: Normocephalic and atraumatic.   Pulmonary:      Effort: Pulmonary effort is normal. No respiratory distress.   Musculoskeletal:         General: Normal range of motion.      Cervical back: Normal range of motion.   Skin:     Findings: No rash.   Neurological:      Mental Status: He is alert and oriented to person, place, and time.   Psychiatric:         Mood and Affect: Mood normal.         Behavior: Behavior normal.         Thought Content: Thought content normal.         Judgment: Judgment normal.           Assessment & Plan:   1. Essential hypertension (Primary)  Overview:  Ramipril 5  Assessment & Plan:  BP shows good control with last BP of 130/76. Continue lifestyle changes, diet, exercise and weight loss.   Last K was 4.1 done on 4/29/2024.  Last Cr was 0.93 done on 4/29/2024.  Last eGFR was 93 on 4/29/2024.  BP Meds: losartan Tabs - 50 MG    2. Mixed hyperlipidemia  Overview:  HDL 36, trigs 160,  but abnormal EBCT, started atorvastatin 40 5/6/2016   Assessment & Plan:  Cholesterol shows Good control. Long term heart-healthy diet and lifestyle discussed and encouraged to reduce risk of cardiovascular disease.  Cholesterol: 95, done on 4/28/2024.  HDL Cholesterol: 33, done on 4/28/2024.  TriGlycerides 342, done on 4/28/2024.  LDL Cholesterol: 14, done on 4/28/2024.   Cholesterol medications include ATORVASTATIN 40 MG Oral Tab [[id number]].     3. Major depression, recurrent, full remission (HCC)  Overview:  Effexor in 2011 and restarted 11/18/2020 at 150 mg luis  Assessment & Plan:  Clinical Course:  stable   Good control  Antidepressant Meds: venlafaxine ER Cp24 - 150 MG   4. Need for vaccination  -     Zoster Vac (Shingrix) in office vaccine- Non-Medicare or Medicare with ABN  5. Screening for prostate cancer  -     PSA Total, Screen; Future; Expected date: 07/10/2024  6. Laboratory examination ordered as part of a routine general medical examination  -     Urinalysis, Routine; Future; Expected date: 07/10/2024  -     PSA Total, Screen; Future; Expected date: 07/10/2024     I am having Mello Alanis maintain his losartan, atorvastatin, aspirin, and venlafaxine ER.       Return in about 6 months (around 1/10/2025) for Annual physical.

## 2024-07-22 ENCOUNTER — LAB ENCOUNTER (OUTPATIENT)
Dept: LAB | Age: 59
End: 2024-07-22
Attending: FAMILY MEDICINE
Payer: COMMERCIAL

## 2024-07-22 DIAGNOSIS — Z12.5 SCREENING FOR PROSTATE CANCER: ICD-10-CM

## 2024-07-22 DIAGNOSIS — Z00.00 LABORATORY EXAMINATION ORDERED AS PART OF A ROUTINE GENERAL MEDICAL EXAMINATION: ICD-10-CM

## 2024-07-22 LAB
BILIRUB UR QL: NEGATIVE
CLARITY UR: CLEAR
COLOR UR: YELLOW
COMPLEXED PSA SERPL-MCNC: 0.64 NG/ML (ref ?–4)
GLUCOSE UR-MCNC: NORMAL MG/DL
KETONES UR-MCNC: NEGATIVE MG/DL
LEUKOCYTE ESTERASE UR QL STRIP.AUTO: NEGATIVE
NITRITE UR QL STRIP.AUTO: NEGATIVE
PH UR: 5.5 [PH] (ref 5–8)
PROT UR-MCNC: 20 MG/DL
SP GR UR STRIP: >1.03 (ref 1–1.03)
UROBILINOGEN UR STRIP-ACNC: NORMAL

## 2024-07-22 PROCEDURE — 84153 ASSAY OF PSA TOTAL: CPT | Performed by: FAMILY MEDICINE

## 2024-07-22 PROCEDURE — 81001 URINALYSIS AUTO W/SCOPE: CPT | Performed by: FAMILY MEDICINE

## 2024-12-03 ENCOUNTER — TELEPHONE (OUTPATIENT)
Dept: FAMILY MEDICINE CLINIC | Facility: CLINIC | Age: 59
End: 2024-12-03

## 2024-12-04 DIAGNOSIS — F33.42 MAJOR DEPRESSION, RECURRENT, FULL REMISSION (HCC): ICD-10-CM

## 2024-12-04 RX ORDER — VENLAFAXINE HYDROCHLORIDE 150 MG/1
150 CAPSULE, EXTENDED RELEASE ORAL DAILY
Qty: 90 CAPSULE | Refills: 1 | Status: SHIPPED | OUTPATIENT
Start: 2024-12-04

## 2024-12-04 NOTE — TELEPHONE ENCOUNTER
Requested Prescriptions     Pending Prescriptions Disp Refills    VENLAFAXINE  MG Oral Capsule SR 24 Hr [Pharmacy Med Name: VENLAFAXINE HCL  MG CAP] 90 capsule 1     Sig: TAKE 1 CAPSULE BY MOUTH EVERY DAY     LOV 7/10/2024     Patient was asked to follow-up in: 6 months    Appointment scheduled: 12/5/2024 Fadi Lara MD     Medication refilled per protocol.

## 2024-12-05 ENCOUNTER — HOSPITAL ENCOUNTER (OUTPATIENT)
Dept: GENERAL RADIOLOGY | Age: 59
Discharge: HOME | End: 2024-12-05
Attending: FAMILY MEDICINE
Payer: COMMERCIAL

## 2024-12-05 ENCOUNTER — OFFICE VISIT (OUTPATIENT)
Dept: FAMILY MEDICINE CLINIC | Facility: CLINIC | Age: 59
End: 2024-12-05
Payer: COMMERCIAL

## 2024-12-05 VITALS
BODY MASS INDEX: 35 KG/M2 | DIASTOLIC BLOOD PRESSURE: 78 MMHG | WEIGHT: 243 LBS | OXYGEN SATURATION: 96 % | TEMPERATURE: 98 F | HEART RATE: 82 BPM | RESPIRATION RATE: 16 BRPM | SYSTOLIC BLOOD PRESSURE: 136 MMHG

## 2024-12-05 DIAGNOSIS — M79.644 FINGER PAIN, RIGHT: ICD-10-CM

## 2024-12-05 DIAGNOSIS — M79.644 FINGER PAIN, RIGHT: Primary | ICD-10-CM

## 2024-12-05 PROCEDURE — 99213 OFFICE O/P EST LOW 20 MIN: CPT | Performed by: FAMILY MEDICINE

## 2024-12-05 PROCEDURE — 73140 X-RAY EXAM OF FINGER(S): CPT | Performed by: FAMILY MEDICINE

## 2024-12-05 PROCEDURE — 3075F SYST BP GE 130 - 139MM HG: CPT | Performed by: FAMILY MEDICINE

## 2024-12-05 PROCEDURE — 3078F DIAST BP <80 MM HG: CPT | Performed by: FAMILY MEDICINE

## 2024-12-05 NOTE — PROGRESS NOTES
Chief Complaint   Patient presents with    Finger Pain     Both hands for the past x3 weeks      HPI:   Mello Alanis is a 59 year old male who presents to the office for finger issues.    Father has long standing issues with his hands, and fingers.   He is having swelling and ROM limitations.  Started 3-4 wseeks on R index finger.  Swelling over the distal aspect.  Lump and burning sensation.     Last week was putting finger in a hold the paper towels and felt sharp pain.  Now unable to fully straighten finger.       REVIEW OF SYSTEMS:   Pertinent items are noted in HPI.  EXAM:   /78   Pulse 82   Temp 97.8 °F (36.6 °C)   Resp 16   Wt 243 lb (110.2 kg)   SpO2 96%   BMI 34.87 kg/m²     General appearance - alert, well appearing, and in no distress  Extremities - redness and swelling on dorsum of R index finger.  Normal flexion strength and sensation  Unable to extend at distal junction  ASSESSMENT AND PLAN:     Mello Alanis was seen in the office today:  had concerns including Finger Pain (Both hands for the past x3 weeks).    1. Finger pain, right  Concern for distal finger fracture or tendon rupture  Will get XR  Avoid bending finger  Will likely need to see ortho hand.   Ice the finger.   - XR FINGER(S) (MIN 2 VIEWS), RIGHT 2ND (CPT=73140); Future        Fadi Lara M.D.   EMG 3  12/05/24          Note to patient: The 21 Century Cures Act makes medical notes like these available to patients in the interest of transparency. However, be advised this is a medical document. It is intended as peer to peer communication. It is written in medical language and may contain abbreviations or verbiage that are unfamiliar. It may appear blunt or direct. Medical documents are intended to carry relevant information, facts as evident, and the clinical opinion of the practitioner.

## 2025-01-27 ENCOUNTER — APPOINTMENT (OUTPATIENT)
Dept: GENERAL RADIOLOGY | Age: 60
End: 2025-01-27
Attending: PHYSICIAN ASSISTANT
Payer: COMMERCIAL

## 2025-01-27 ENCOUNTER — HOSPITAL ENCOUNTER (OUTPATIENT)
Age: 60
Discharge: HOME | End: 2025-01-27
Payer: COMMERCIAL

## 2025-01-27 VITALS
TEMPERATURE: 99 F | DIASTOLIC BLOOD PRESSURE: 86 MMHG | HEIGHT: 70 IN | HEART RATE: 95 BPM | WEIGHT: 240 LBS | OXYGEN SATURATION: 97 % | BODY MASS INDEX: 34.36 KG/M2 | SYSTOLIC BLOOD PRESSURE: 151 MMHG | RESPIRATION RATE: 16 BRPM

## 2025-01-27 DIAGNOSIS — J20.8 ACUTE VIRAL BRONCHITIS: Primary | ICD-10-CM

## 2025-01-27 LAB
POCT INFLUENZA A: NEGATIVE
POCT INFLUENZA B: NEGATIVE
SARS-COV-2 RNA RESP QL NAA+PROBE: NOT DETECTED

## 2025-01-27 PROCEDURE — 87502 INFLUENZA DNA AMP PROBE: CPT | Performed by: PHYSICIAN ASSISTANT

## 2025-01-27 PROCEDURE — 71046 X-RAY EXAM CHEST 2 VIEWS: CPT | Performed by: PHYSICIAN ASSISTANT

## 2025-01-27 PROCEDURE — 99214 OFFICE O/P EST MOD 30 MIN: CPT

## 2025-01-27 RX ORDER — BENZONATATE 100 MG/1
100 CAPSULE ORAL 3 TIMES DAILY PRN
Qty: 30 CAPSULE | Refills: 0 | Status: SHIPPED | OUTPATIENT
Start: 2025-01-27 | End: 2025-02-26

## 2025-01-27 NOTE — ED PROVIDER NOTES
Patient Seen in: [facility]      History     Chief Complaint   Patient presents with    Cough     Stated Complaint: Cough    Subjective:   HPI    59-year-old male with known history of depression, sleep apnea, hyperlipidemia and hypertension who comes in today complaining of cough, congestion headache body aches over the past 2 days.  Denies shortness of breath or chest pain.  Patient states that dad has been in and out of the hospital with influenza A and pneumonia brother was recently diagnosed with pneumonia.      Objective:     Past Medical History:    Decorative tattoo    Depression    Essential hypertension    Hemorrhoids    High blood pressure    High cholesterol    Hx of motion sickness    Hyperlipidemia    Sleep apnea    CPAP              Past Surgical History:   Procedure Laterality Date    Colonoscopy      Colonoscopy      Other      shoulder rt                Social History     Socioeconomic History    Marital status:    Tobacco Use    Smoking status: Never    Smokeless tobacco: Never   Vaping Use    Vaping status: Never Used   Substance and Sexual Activity    Alcohol use: Yes     Comment: Social Drinker    Drug use: No   Other Topics Concern    Caffeine Concern Yes     Comment: more than 5 cups per day    Exercise Yes     Comment: 1x per week    Seat Belt Yes     Social Drivers of Health     Financial Resource Strain: Low Risk  (4/30/2024)    Financial Resource Strain     Difficulty of Paying Living Expenses: Not hard at all   Food Insecurity: No Food Insecurity (4/28/2024)    Food Insecurity     Food Insecurity: Never true   Transportation Needs: No Transportation Needs (4/28/2024)    Transportation Needs     Lack of Transportation: No   Housing Stability: Low Risk  (4/28/2024)    Housing Stability     Housing Instability: No              Review of Systems    Positive for stated complaint: Cough  Other systems are as noted in HPI.  Constitutional and vital signs reviewed.       All other systems reviewed and negative except as noted above.    Physical Exam     ED Triage Vitals   BP 01/27/25 0946 151/86   Pulse 01/27/25 0943 95   Resp 01/27/25 0943 16   Temp 01/27/25 0943 98.7 °F (37.1 °C)   Temp src 01/27/25 0943 Oral   SpO2 01/27/25 0943 97 %   O2 Device 01/27/25 0943 None (Room air)       Current Vitals:   Vital Signs  BP: 151/86  Pulse: 95  Resp: 16  Temp: 98.7 °F (37.1 °C)  Temp src: Oral    Oxygen Therapy  SpO2: 97 %  O2 Device: None (Room air)        Physical Exam  General Appearance: Alert, cooperative, no distress, appropriate for age   Head: Normocephalic, without obvious abnormality   Eyes: PERRL,  conjunctiva and cornea clear, both eyes   Ears: TM pearly gray color and semitransparent, external ear canals normal, both ears   Nose: Nares symmetrical, septum midline, mucosa normal, clear watery discharge; no sinus tenderness   Throat: Lips, tongue, and mucosa are moist, pink, and intact; teeth intact. No erythema, no exudates or tonsillar hypertrophy, uvula midline, no trismus or drooling no phonation changes, patient handling secretions well   Neck: Supple; no anterior or posterior cervical adenopathy, no neck rigidity or meningeal signs  Lungs: Clear to auscultation bilaterally, respirations unlabored. No wheezing, rales or rhonchi.   Heart: NSR, S1, S2 present. No murmurs, rubs or gallops.  Skin: no rash         ED Course     Labs Reviewed   RAPID SARS-COV-2 BY PCR - Normal   POCT FLU TEST - Normal    Narrative:     This assay is a rapid molecular in vitro test utilizing nucleic acid amplification of influenza A and B viral RNA.        XR CHEST PA + LAT CHEST (CPT=71046)    Result Date: 1/27/2025  PROCEDURE:  XR CHEST PA + LAT CHEST (CPT=71046)  INDICATIONS:  Cough  COMPARISON:  [facility], XR, XR CHEST AP PORTABLE  (CPT=71045), 4/28/2024, 7:42 PM.  [facility], XR, CHEST PA   LATERAL, 2/10/2011, 1:40 PM.  TECHNIQUE:  PA and lateral chest radiographs were obtained.  PATIENT  STATED HISTORY: (As transcribed by Technologist)  cough for four days    FINDINGS:  Patient rotated to right.  Normal heart size and pulmonary vascularity.  Mildly tortuous aorta.  No focal pulmonary opacity.  No pleural effusion.  Degenerative spurring of thoracic spine.            CONCLUSION:  No evidence of active cardiopulmonary disease.   LOCATION:  [facility]   Dictated by (CST): Jr Garcia MD on 1/27/2025 at 11:00 AM     Finalized by (CST): Jr Garcia MD on 1/27/2025 at 11:00 AM             MDM        Medical Decision Making  59-year-old male who comes in today complaining of cough, congestion fever and bodyaches.  Patient states that father has been in the hospital with influenza pneumonia brother was recently diagnosed with pneumonia so she is concerned.    Problems Addressed:  Acute viral bronchitis: acute illness or injury    Amount and/or Complexity of Data Reviewed  Labs: ordered. Decision-making details documented in ED Course.     Details: Covid and flu neg   Radiology: ordered and independent interpretation performed. Decision-making details documented in ED Course.     Details: I personally reviewed the patients CXR no evidence of acute consolidation or pleural effusion    Risk  OTC drugs.  Prescription drug management.  Risk Details: Clinical Impression: Bronchitis       The differential diagnosis before testing included Pneumonia, COVID-19, Bronchitis , which is a medical condition that poses a threat to life/function.    John Esteban prescribed discussed over-the-counter medication        Disposition and Plan     Clinical Impression:  1. Acute viral bronchitis         Disposition:  Discharge  1/27/2025 11:12 am    Follow-up:  Kingsley Manzo MD  [address]  [facility] [id number] [phone]    Schedule an appointment as soon as possible for a visit   If symptoms worsen          Medications Prescribed:  Discharge Medication List as of 1/27/2025 11:13 AM        START taking these  medications    Details   benzonatate 100 MG Oral Cap Take 1 capsule (100 mg total) by mouth 3 (three) times daily as needed for cough., Normal, Disp-30 capsule, R-0                 Supplementary Documentation:

## 2025-02-23 ENCOUNTER — HOSPITAL ENCOUNTER (OUTPATIENT)
Age: 60
Discharge: HOME | End: 2025-02-23
Attending: EMERGENCY MEDICINE
Payer: COMMERCIAL

## 2025-02-23 ENCOUNTER — APPOINTMENT (OUTPATIENT)
Dept: CT IMAGING | Age: 60
End: 2025-02-23
Attending: EMERGENCY MEDICINE
Payer: COMMERCIAL

## 2025-02-23 VITALS
HEART RATE: 81 BPM | TEMPERATURE: 99 F | SYSTOLIC BLOOD PRESSURE: 101 MMHG | RESPIRATION RATE: 18 BRPM | BODY MASS INDEX: 35.07 KG/M2 | WEIGHT: 245 LBS | OXYGEN SATURATION: 99 % | HEIGHT: 70 IN | DIASTOLIC BLOOD PRESSURE: 49 MMHG

## 2025-02-23 DIAGNOSIS — K57.92 ACUTE DIVERTICULITIS: Primary | ICD-10-CM

## 2025-02-23 LAB
#MXD IC: 1 X10ˆ3/UL (ref 0.1–1)
BILIRUB UR QL STRIP: NEGATIVE
BUN BLD-MCNC: 14 MG/DL (ref 7–18)
CHLORIDE BLD-SCNC: 102 MMOL/L (ref 98–112)
CO2 BLD-SCNC: 27 MMOL/L (ref 21–32)
CREAT BLD-MCNC: 1 MG/DL
EGFRCR SERPLBLD CKD-EPI 2021: 86 ML/MIN/1.73M2 (ref 60–?)
GLUCOSE BLD-MCNC: 116 MG/DL (ref 70–99)
GLUCOSE UR STRIP-MCNC: NEGATIVE MG/DL
HCT VFR BLD AUTO: 42.9 %
HCT VFR BLD CALC: 43 %
HGB BLD-MCNC: 14.8 G/DL
ISTAT IONIZED CALCIUM FOR CHEM 8: 1.23 MMOL/L (ref 1.12–1.32)
KETONES UR STRIP-MCNC: NEGATIVE MG/DL
LEUKOCYTE ESTERASE UR QL STRIP: NEGATIVE
LYMPHOCYTES # BLD AUTO: 2 X10ˆ3/UL (ref 1–4)
LYMPHOCYTES NFR BLD AUTO: 25.4 %
MCH RBC QN AUTO: 31.6 PG (ref 26–34)
MCHC RBC AUTO-ENTMCNC: 34.5 G/DL (ref 31–37)
MCV RBC AUTO: 91.5 FL (ref 80–100)
MIXED CELL %: 13.4 %
NEUTROPHILS # BLD AUTO: 4.7 X10ˆ3/UL (ref 1.5–7.7)
NEUTROPHILS NFR BLD AUTO: 61.2 %
NITRITE UR QL STRIP: NEGATIVE
PH UR STRIP: 6.5 [PH]
PLATELET # BLD AUTO: 195 X10ˆ3/UL (ref 150–450)
POTASSIUM BLD-SCNC: 3.9 MMOL/L (ref 3.6–5.1)
PROT UR STRIP-MCNC: 30 MG/DL
RBC # BLD AUTO: 4.69 X10ˆ6/UL
SODIUM BLD-SCNC: 141 MMOL/L (ref 136–145)
SP GR UR STRIP: >=1.03
UROBILINOGEN UR STRIP-ACNC: <2 MG/DL
WBC # BLD AUTO: 7.7 X10ˆ3/UL (ref 4–11)

## 2025-02-23 PROCEDURE — 81002 URINALYSIS NONAUTO W/O SCOPE: CPT

## 2025-02-23 PROCEDURE — 85025 COMPLETE CBC W/AUTO DIFF WBC: CPT | Performed by: EMERGENCY MEDICINE

## 2025-02-23 PROCEDURE — 99214 OFFICE O/P EST MOD 30 MIN: CPT

## 2025-02-23 PROCEDURE — 74177 CT ABD & PELVIS W/CONTRAST: CPT | Performed by: EMERGENCY MEDICINE

## 2025-02-23 PROCEDURE — 99215 OFFICE O/P EST HI 40 MIN: CPT

## 2025-02-23 PROCEDURE — 80047 BASIC METABLC PNL IONIZED CA: CPT

## 2025-02-23 PROCEDURE — 96360 HYDRATION IV INFUSION INIT: CPT

## 2025-02-23 RX ORDER — CIPROFLOXACIN 500 MG/1
500 TABLET, FILM COATED ORAL 2 TIMES DAILY
Qty: 14 TABLET | Refills: 0 | Status: SHIPPED | OUTPATIENT
Start: 2025-02-23 | End: 2025-03-02

## 2025-02-23 RX ORDER — KETOROLAC TROMETHAMINE 30 MG/ML
30 INJECTION, SOLUTION INTRAMUSCULAR; INTRAVENOUS ONCE
Status: DISCONTINUED | OUTPATIENT
Start: 2025-02-23 | End: 2025-02-23

## 2025-02-23 RX ORDER — METRONIDAZOLE 500 MG/1
500 TABLET ORAL 3 TIMES DAILY
Qty: 21 TABLET | Refills: 0 | Status: SHIPPED | OUTPATIENT
Start: 2025-02-23 | End: 2025-03-02

## 2025-02-23 RX ORDER — ACETAMINOPHEN 500 MG
1000 TABLET ORAL ONCE
Status: COMPLETED | OUTPATIENT
Start: 2025-02-23 | End: 2025-02-23

## 2025-02-23 RX ORDER — SODIUM CHLORIDE 9 MG/ML
1000 INJECTION, SOLUTION INTRAVENOUS ONCE
Status: COMPLETED | OUTPATIENT
Start: 2025-02-23 | End: 2025-02-23

## 2025-02-23 NOTE — ED INITIAL ASSESSMENT (HPI)
Patient presents to IC with c/o urinary frequency since Thursday.  Also reports bladder/flank pain.

## 2025-02-23 NOTE — ED PROVIDER NOTES
Patient Seen in: [facility]      History     Chief Complaint   Patient presents with    Urinary Symptoms            Stated Complaint: abdominal, groin, back pain, frequent urination    Subjective:   HPI      58 yo male with lower abdominal pain since Thursday as well as urinary frequency. No dysuria. No vomiting or diarrhea. Low fever upon arrival in IC. No recent URI symptoms.     Objective:     Past Medical History:    Decorative tattoo    Depression    Essential hypertension    Hemorrhoids    High blood pressure    High cholesterol    Hx of motion sickness    Hyperlipidemia    Sleep apnea    CPAP              Past Surgical History:   Procedure Laterality Date    Colonoscopy      Colonoscopy      Other      shoulder rt                Social History     Socioeconomic History    Marital status:    Tobacco Use    Smoking status: Never    Smokeless tobacco: Never   Vaping Use    Vaping status: Never Used   Substance and Sexual Activity    Alcohol use: Yes     Comment: Social Drinker    Drug use: No   Other Topics Concern    Caffeine Concern Yes     Comment: more than 5 cups per day    Exercise Yes     Comment: 1x per week    Seat Belt Yes     Social Drivers of Health     Food Insecurity: No Food Insecurity (4/28/2024)    Food Insecurity     Food Insecurity: Never true   Transportation Needs: No Transportation Needs (4/28/2024)    Transportation Needs     Lack of Transportation: No   Housing Stability: Low Risk  (4/28/2024)    Housing Stability     Housing Instability: No              Review of Systems    Positive for stated complaint: abdominal, groin, back pain, frequent urination  Other systems are as noted in HPI.  Constitutional and vital signs reviewed.      All other systems reviewed and negative except as noted above.    Physical Exam     ED Triage Vitals [02/23/25 0831]   /64   Pulse 108   Resp 20   Temp (!) 100.5 °F (38.1 °C)   Temp src Oral   SpO2 97 %   O2 Device None (Room  air)       Current Vitals:   Vital Signs  BP: 101/49  Pulse: 81  Resp: 18  Temp: 98.9 °F (37.2 °C)  Temp src: Oral    Oxygen Therapy  SpO2: 99 %  O2 Device: None (Room air)        Physical Exam  Vitals and nursing note reviewed.   Constitutional:       Appearance: Normal appearance. He is well-developed.   HENT:      Head: Normocephalic and atraumatic.   Cardiovascular:      Rate and Rhythm: Regular rhythm. Tachycardia present.   Pulmonary:      Effort: Pulmonary effort is normal. No respiratory distress.   Abdominal:      Palpations: Abdomen is soft.      Tenderness: There is no abdominal tenderness (suprapubic and left lower quadrant).   Skin:     General: Skin is warm and dry.      Capillary Refill: Capillary refill takes less than 2 seconds.   Neurological:      General: No focal deficit present.      Mental Status: He is alert.   Psychiatric:         Mood and Affect: Mood normal.         Behavior: Behavior normal.            ED Course     Labs Reviewed   [facility] POCT URINALYSIS DIPSTICK - Abnormal; Notable for the following components:       Result Value    Urine Color Nasra (*)     Urine Clarity Cloudy (*)     Protein urine 30 (*)     Blood, Urine Small (*)     All other components within normal limits   POCT ISTAT CHEM8 CARTRIDGE - Abnormal; Notable for the following components:    ISTAT Glucose 116 (*)     All other components within normal limits   POCT CBC                   MDM           Medical Decision Making  Diverticulitis, colitis, urinary tract infection, intestinal perforation all in differential.   Labs reviewed by myself.   CBC normal. Chem: mild hyperglycemia but not a fasting test. Urine concentrated but no signs of infection.   CT images reviewed by myself. Diverticulitis is present.   Discharge on cipro and flagyl. Follow up with primary care next week. To ER if worse.     Disposition and Plan     Clinical Impression:  1. Acute diverticulitis         Disposition:  Discharge  2/23/2025 10:21  am    Follow-up:  Kingsley Manzo MD  [address]  [facility] [address]  [phone]      call Monday for follow up appointment in 7-10 days          Medications Prescribed:  Current Discharge Medication List        START taking these medications    Details   ciprofloxacin 500 MG Oral Tab Take 1 tablet (500 mg total) by mouth 2 (two) times daily for 7 days.  Qty: 14 tablet, Refills: 0      metroNIDAZOLE 500 MG Oral Tab Take 1 tablet (500 mg total) by mouth 3 (three) times daily for 7 days.  Qty: 21 tablet, Refills: 0                 Supplementary Documentation:

## 2025-02-23 NOTE — DISCHARGE INSTRUCTIONS
Cipro and flagyl as prescribed  To ER for any new or worsening symptoms.   Follow up with Dr Manzo

## 2025-03-10 PROBLEM — Z98.890 POSTOPERATIVE STATE: Status: RESOLVED | Noted: 2023-08-08 | Resolved: 2025-03-10

## 2025-03-11 NOTE — ASSESSMENT & PLAN NOTE
Clinical Course: stable   Good control    Antidepressant Meds: venlafaxine ER Cp24 - 150 MG

## 2025-03-11 NOTE — ASSESSMENT & PLAN NOTE
Cholesterol shows Good control. Long term heart-healthy diet and lifestyle discussed and encouraged to reduce risk of cardiovascular disease.  4/28/2024: Cholesterol, Total 95; HDL Cholesterol 33 (L); Triglycerides 342 (H); LDL Cholesterol 14  Cholesterol Meds: atorvastatin Tabs - 40 MG  stable  Continue with current treatment plan

## 2025-03-11 NOTE — ASSESSMENT & PLAN NOTE
BP shows borderline control with last BP of 136/70. Work on lifestyle changes, diet, exercise and weight management.   4/29/2024: Potassium 4.1; Creatinine 0.93  2/23/2025: eGFR-Cr 86  BP Meds: losartan Tabs - 50 MG   ACE/ARB Adherence Good at 95%

## 2025-03-12 ENCOUNTER — OFFICE VISIT (OUTPATIENT)
Dept: FAMILY MEDICINE CLINIC | Facility: CLINIC | Age: 60
End: 2025-03-12
Payer: COMMERCIAL

## 2025-03-12 VITALS
HEART RATE: 72 BPM | RESPIRATION RATE: 14 BRPM | WEIGHT: 249 LBS | HEIGHT: 70 IN | BODY MASS INDEX: 35.65 KG/M2 | SYSTOLIC BLOOD PRESSURE: 136 MMHG | DIASTOLIC BLOOD PRESSURE: 70 MMHG

## 2025-03-12 DIAGNOSIS — K21.9 GASTROESOPHAGEAL REFLUX DISEASE, UNSPECIFIED WHETHER ESOPHAGITIS PRESENT: ICD-10-CM

## 2025-03-12 DIAGNOSIS — I51.5 CARDIAC CALCIFICATION (HCC): Primary | ICD-10-CM

## 2025-03-12 DIAGNOSIS — N40.1 BPH ASSOCIATED WITH NOCTURIA: ICD-10-CM

## 2025-03-12 DIAGNOSIS — F33.42 MAJOR DEPRESSION, RECURRENT, FULL REMISSION: ICD-10-CM

## 2025-03-12 DIAGNOSIS — I10 ESSENTIAL HYPERTENSION: ICD-10-CM

## 2025-03-12 DIAGNOSIS — I77.819 AORTIC DILATATION: ICD-10-CM

## 2025-03-12 DIAGNOSIS — R35.1 BPH ASSOCIATED WITH NOCTURIA: ICD-10-CM

## 2025-03-12 DIAGNOSIS — K57.32 DIVERTICULITIS OF LARGE INTESTINE WITHOUT PERFORATION OR ABSCESS WITHOUT BLEEDING: ICD-10-CM

## 2025-03-12 DIAGNOSIS — E78.2 MIXED HYPERLIPIDEMIA: ICD-10-CM

## 2025-03-12 PROCEDURE — 3008F BODY MASS INDEX DOCD: CPT | Performed by: FAMILY MEDICINE

## 2025-03-12 PROCEDURE — 99214 OFFICE O/P EST MOD 30 MIN: CPT | Performed by: FAMILY MEDICINE

## 2025-03-12 PROCEDURE — G2211 COMPLEX E/M VISIT ADD ON: HCPCS | Performed by: FAMILY MEDICINE

## 2025-03-12 PROCEDURE — 3075F SYST BP GE 130 - 139MM HG: CPT | Performed by: FAMILY MEDICINE

## 2025-03-12 PROCEDURE — 3078F DIAST BP <80 MM HG: CPT | Performed by: FAMILY MEDICINE

## 2025-03-12 RX ORDER — PANTOPRAZOLE SODIUM 40 MG/1
40 TABLET, DELAYED RELEASE ORAL
Qty: 90 TABLET | Refills: 0 | Status: SHIPPED | OUTPATIENT
Start: 2025-03-12

## 2025-03-12 NOTE — PROGRESS NOTES
The following individual(s) verbally consented to be recorded using ambient AI listening technology and understand that they can each withdraw their consent to this listening technology at any point by asking the clinician to turn off or pause the recording:    Patient name: Mello Alanis

## 2025-03-12 NOTE — PROGRESS NOTES
Subjective:   Mello is a 60 year old male coming in for had concerns including ER F/U (Went in for a stomach flare up ).   HPI  History of Present Illness  He presents with a follow-up for diverticulitis.    He experienced his first episode of diverticulitis after consuming [employer] orange chicken approximately two weeks ago. This was characterized by severe abdominal pain and cramping over the weekend following the meal, without diarrhea but with frequent urination and fever. The pain was significant enough that he could not 'suck in his stomach' without discomfort. Over the past two weeks, symptoms have gradually resolved, and he reports feeling normal as of today.    He has issues with red sauce, which causes discomfort in the upper abdominal area, suggesting possible acid reflux. He experiences occasional choking on his own saliva and difficulty breathing with his CPAP machine. There is a family history of esophageal issues, as his grandmother  of a condition he thought was throat cancer, possibly related to tobacco use.    He reports an enlarged prostate and episodes of blood in the urine. He experiences frequent nocturnal urination, which he describes as significant in volume.    A colonoscopy performed a year and a half ago identified scattered diverticuli in the left colon, along with polyps. He is currently on a five-year colonoscopy schedule, with the last procedure performed on , a year and a half ago.     Doing well overall. 2/3/2025 in .     /70   Pulse 72   Resp 14   Ht 5' 10\" (1.778 m)   Wt 249 lb (112.9 kg)   BMI 35.73 kg/m²  Body mass index is 35.73 kg/m².   Physical Exam  Vitals and nursing note reviewed.   Constitutional:       General: He is not in acute distress.     Appearance: Normal appearance. He is well-developed.   HENT:      Head: Normocephalic and atraumatic.   Cardiovascular:      Rate and Rhythm: Normal rate and regular rhythm.      Pulses:            Posterior tibial pulses are 2+ on the right side and 2+ on the left side.      Heart sounds: Normal heart sounds. No murmur heard.  Pulmonary:      Effort: Pulmonary effort is normal. No respiratory distress.      Breath sounds: Normal breath sounds. No wheezing.   Abdominal:      General: Bowel sounds are normal.      Palpations: Abdomen is soft.      Tenderness: There is no abdominal tenderness.   Musculoskeletal:         General: Normal range of motion.      Cervical back: Normal range of motion.      Right lower leg: No edema.      Left lower leg: No edema.   Skin:     General: Skin is warm and dry.      Findings: No rash.   Neurological:      Mental Status: He is alert and oriented to person, place, and time.   Psychiatric:         Mood and Affect: Mood normal.         Behavior: Behavior normal.         Thought Content: Thought content normal.         Judgment: Judgment normal.        Physical Exam  ABDOMEN: Soft, non-tender, non-distended, without organomegaly.        Results  RADIOLOGY  CT scan: Artery calcifications and diverticuli (02/23/2025)    DIAGNOSTIC  Colonoscopy: Sessile polyps and scattered diverticuli in the left colon (07/07/2023)     Assessment & Plan  Cardiac calcification (HCC)  Seen 2016, Kalathiveetil  managing from CV         Aortic dilatation  Seen in 2024, will repeat this year.          Major depression, recurrent, full remission  Clinical Course: stable   Good control    Antidepressant Meds: venlafaxine ER Cp24 - 150 MG          Mixed hyperlipidemia  Cholesterol shows Good control. Long term heart-healthy diet and lifestyle discussed and encouraged to reduce risk of cardiovascular disease.  4/28/2024: Cholesterol, Total 95; HDL Cholesterol 33 (L); Triglycerides 342 (H); LDL Cholesterol 14  Cholesterol Meds: atorvastatin Tabs - 40 MG  stable  Continue with current treatment plan          Essential hypertension  BP shows borderline control with last BP of 136/70. Work on lifestyle  changes, diet, exercise and weight management.   4/29/2024: Potassium 4.1; Creatinine 0.93  2/23/2025: eGFR-Cr 86  BP Meds: losartan Tabs - 50 MG   ACE/ARB Adherence Good at 95%           Gastroesophageal reflux disease, unspecified whether esophagitis present         BPH associated with nocturia    Orders:    UROLOGY - INTERNAL    Diverticulitis of large intestine without perforation or abscess without bleeding            Other orders    Pantoprazole Sodium; Take 1 tablet (40 mg total) by mouth every morning before breakfast.  Dispense: 90 tablet; Refill: 0     Assessment & Plan  Diverticulitis  First episode of diverticulitis with symptoms improving over two weeks. CT showed diverticuli and artery calcifications. Colonoscopy revealed scattered diverticuli and a removed polyp. Recommended high-fiber diet to prevent recurrence. Surgery rarely needed.  - Increase dietary fiber intake.  - Avoid hard foods like popcorn.  - Seek medical attention if symptoms worsen.    Gastroesophageal reflux disease (GERD)  Upper abdominal discomfort with red sauce suggests acid reflux. Proposed acid suppression trial to assess symptom improvement and rule out chronic conditions like Sifuentes's esophagus. Long-term acid suppression has risks.  - Prescribe pantoprazole once daily for 6-12 weeks.  - Consider upper GI endoscopy if symptoms persist after medication trial.    Benign prostatic hyperplasia (BPH)  Enlarged prostate with frequent urination and nocturia. Low PSA levels reduce likelihood of prostate cancer. Family history of prostate issues noted. Referral to urologist planned.  - Refer to urologist for further evaluation and management.  I am having Mello Camp Alanis maintain his losartan, atorvastatin, aspirin, and venlafaxine ER.       Return in about 3 months (around 6/12/2025) for Annual physical.

## 2025-04-01 DIAGNOSIS — F33.42 MAJOR DEPRESSION, RECURRENT, FULL REMISSION: ICD-10-CM

## 2025-04-01 RX ORDER — VENLAFAXINE HYDROCHLORIDE 150 MG/1
150 CAPSULE, EXTENDED RELEASE ORAL DAILY
Qty: 90 CAPSULE | Refills: 1 | Status: SHIPPED | OUTPATIENT
Start: 2025-04-01

## 2025-04-01 NOTE — TELEPHONE ENCOUNTER
Requested Prescriptions     Signed Prescriptions Disp Refills    VENLAFAXINE  MG Oral Capsule SR 24 Hr 90 capsule 1     Sig: TAKE 1 CAPSULE BY MOUTH EVERY DAY     Authorizing Provider: JV WARNER     Ordering User: MARY SAN      Refilled per protocol/OV notes

## 2025-08-06 ENCOUNTER — TELEPHONE (OUTPATIENT)
Dept: FAMILY MEDICINE CLINIC | Facility: CLINIC | Age: 60
End: 2025-08-06

## 2025-08-06 DIAGNOSIS — I10 ESSENTIAL HYPERTENSION: ICD-10-CM

## 2025-08-06 DIAGNOSIS — E78.5 DYSLIPIDEMIA: ICD-10-CM

## 2025-08-06 DIAGNOSIS — Z13.29 SCREENING FOR THYROID DISORDER: ICD-10-CM

## 2025-08-06 DIAGNOSIS — E55.9 VITAMIN D DEFICIENCY: ICD-10-CM

## 2025-08-06 DIAGNOSIS — Z13.6 SCREENING FOR CARDIOVASCULAR CONDITION: ICD-10-CM

## 2025-08-06 DIAGNOSIS — Z12.5 SCREENING FOR PROSTATE CANCER: ICD-10-CM

## 2025-08-06 DIAGNOSIS — Z13.1 SCREENING FOR DIABETES MELLITUS: Primary | ICD-10-CM

## 2025-08-06 DIAGNOSIS — Z00.00 LABORATORY EXAMINATION ORDERED AS PART OF A ROUTINE GENERAL MEDICAL EXAMINATION: ICD-10-CM

## 2025-08-06 DIAGNOSIS — Z13.0 SCREENING FOR IRON DEFICIENCY ANEMIA: ICD-10-CM

## (undated) DEVICE — CURAD NONADHERANT PAD 3X8

## (undated) DEVICE — GAUZE STERILE 4X4 12PLY

## (undated) DEVICE — SOL NACL IRRIG 0.9% 1000ML BTL

## (undated) DEVICE — MINI LAP PACK-LF: Brand: MEDLINE INDUSTRIES, INC.

## (undated) DEVICE — 3M™ TEGADERM™ TRANSPARENT FILM DRESSING FRAME STYLE, 1624W, US-VER, 100/CARTON 4 CARTONS/CASE: Brand: 3M™ TEGADERM™

## (undated) DEVICE — SLEEVE KENDALL SCD EXPRESS MED

## (undated) DEVICE — 3M™ TEGADERM™ TRANSPARENT FILM DRESSING, 1626W, 4 IN X 4-3/4 IN (10 CM X 12 CM), 50 EACH/CARTON, 4 CARTON/CASE: Brand: 3M™ TEGADERM™

## (undated) DEVICE — SUT VICRYL 2-0 SH J417H

## (undated) DEVICE — SUT MONOCRYL 4-0 PS-2 Y496G

## (undated) DEVICE — MEGADYNE ELECTRODE ADULT PT RT

## (undated) DEVICE — VIOLET BRAIDED (POLYGLACTIN 910), SYNTHETIC ABSORBABLE SUTURE: Brand: COATED VICRYL

## (undated) DEVICE — STERILE SYNTHETIC POLYISOPRENE POWDER-FREE SURGICAL GLOVES WITH HYDROGEL COATING: Brand: PROTEXIS

## (undated) DEVICE — STOCKINETTE STER DBL PLY 4X54

## (undated) NOTE — LETTER
06/15/23    Patient: Maria C Benson  : 3/3/1965 Visit date: 6/15/2023    Dear  Lisa Meyers MD    Thank you for referring Maria C Benson to my practice. Please find my assessment and plan below. I saw Michael Simmons in the office, he presents with multiple soft tissue masses of his right arm and chest.  I am planning excision under IV sedation.   Thank you Cornelius  Sincerely,       Ervin Andrews DO   CC:   No Recipients

## (undated) NOTE — LETTER
4/13/2020    Name: Ronnie Phillips    YOB: 1965        To whom it may concern:    Ronnie Phillips is under my care. He was COVID-19 positive with symptoms starting 3/24/2020.  He now has greatly improved and can now return to work as it has been mor

## (undated) NOTE — Clinical Note
ROLAND Michelle completed TCM. Patient has upcoming TCM/HFU appointment scheduled on 05/9/24. Thank you.

## (undated) NOTE — LETTER
[address]  [id number]  Consent for Procedure/Sedation  Date: 4/29/24         Time: 0930    I hereby authorize Dr Samuel, my physician and his/her assistants (if applicable), which may include medical students, residents, and/or fellows, to perform the following surgical operation/ procedure and administer such anesthesia as may be determined necessary by my physician:Operation/Procedure name (s)  Cardiac Catheterization, Left Ventricular Cineangiography, Bilateral Selective Coronary Angiography and/or Right Heart Catheterization; possible Percutaneous Transluminal Coronary Angioplasty, Coronary Atherectomy, Coronary Stent, Intracoronary Thrombolytic therapy, Antiplatelet therapy and/or Intravascular Ultrasound on Mello Alanis   2.   I recognize that during the surgical operation/procedure, unforeseen conditions may necessitate additional or different procedures than those listed above.  I, therefore, further authorize and request that the above-named surgeon, assistants, or designees perform such procedures as are, in their judgment, necessary and desirable.    3.   My surgeon/physician has discussed prior to my surgery the potential benefits, risks and side effects of this procedure; the likelihood of achieving goals; and potential problems that might occur during recuperation.  They also discussed reasonable alternatives to the procedure, including risks, benefits, and side effects related to the alternatives and risks related to not receiving this procedure.  I have had all my questions answered and I acknowledge that no guarantee has been made as to the result that may be obtained.    4.   Should the need arise during my operation/procedure, which includes change of level of care prior to discharge, I also consent to the administration of blood and/or blood products.  Further, I understand that despite careful testing and screening of blood or blood products by  collecting agencies, I may still be subject to ill effects as a result of receiving a blood transfusion and/or blood products.  The following are some, but not all, of the potential risks that can occur: fever and allergic reactions, hemolytic reactions, transmission of diseases such as Hepatitis, AIDS and Cytomegalovirus (CMV) and fluid overload.  In the event that I wish to have an autologous transfusion of my own blood, or a directed donor transfusion, I will discuss this with my physician.  Check only if Refusing Blood or Blood Products  I understand refusal of blood or blood products as deemed necessary by my physician may have serious consequences to my condition to include possible death. I hereby assume responsibility for my refusal and release the hospital, its personnel, and my physicians from any responsibility for the consequences of my refusal.          o  Refuse      5.   I authorize the use of any specimen, organs, tissues, body parts or foreign objects that may be removed from my body during the operation/procedure for diagnosis, research or teaching purposes and their subsequent disposal by hospital authorities.  I also authorize the release of specimen test results and/or written reports to my treating physician on the hospital medical staff or other referring or consulting physicians involved in my care, at the discretion of the Pathologist or my treating physician.    6.   I consent to the photographing or videotaping of the operations or procedures to be performed, including appropriate portions of my body for medical, scientific, or educational purposes, provided my identity is not revealed by the pictures or by descriptive texts accompanying them.  If the procedure has been photographed/videotaped, the surgeon will obtain the original picture, image, videotape or CD.  The hospital will not be responsible for storage, release or maintenance of the picture, image, tape or CD.    7.   I consent  to the presence of a  or observers in the operating room as deemed necessary by my physician or their designees.    8.   I recognize that in the event my procedure results in extended X-Ray/fluoroscopy time, I may develop a skin reaction.    9. If I have a Do Not Attempt Resuscitation (DNAR) order in place, that status will be suspended while in the operating room, procedural suite, and during the recovery period unless otherwise explicitly stated by me (or a person authorized to consent on my behalf). The surgeon or my attending physician will determine when the applicable recovery period ends for purposes of reinstating the DNAR order.  10. Patients having a sterilization procedure: I understand that if the procedure is successful the results will be permanent and it will therefore be impossible for me to inseminate, conceive, or bear children.  I also understand that the procedure is intended to result in sterility, although the result has not been guaranteed.   11. I acknowledge that my physician has explained sedation/analgesia administration to me including the risk and benefits I consent to the administration of sedation/analgesia as may be necessary or desirable in the judgment of my physician.    I CERTIFY THAT I HAVE READ AND FULLY UNDERSTAND THE ABOVE CONSENT TO OPERATION and/or OTHER PROCEDURE.      ____________________________________       _________________________________      ______________________________  Signature of Patient         Signature of Responsible Person        Printed Name of Responsible Person   ____________________________________      _________________________________      ______________________________       Signature of Witness          Relationship to Patient                       Date                                       Time  Patient Name: GuyCy Alanis  : 3/3/1965    Reviewed: 2024   Printed: 2024  Medical Record #: [id number]  Page 1 of 1

## (undated) NOTE — LETTER
PATIENT NAME: Mello Alanis   : 3/3/1965       Waiver      DATE: 7/10/2024     Dear Patient,    Thank you for choosing [facility] as your health care provider. Your physician has deemed the following medical service(s) necessary.  However, your insurance plan may not pay for all of your health care and costs and may deny payment for this service.  The fact that your insurance plan does not pay for an item or service does not mean you should not receive it.  The purpose of this form is to help you make an informed decision about whether or not you want to receive this service(s) that may not be paid for by your insurance plan.      ESTIMATED COST: Shingrix: $337  Administration: $65  Total: $402          I understand that the above mentioned service(s) or supply may not be covered by my insurance company.  I agree to be financially responsible for the cost of this service or supply in the event my insurance denies payment as a non-covered benefit.      Patient/Insured Signature: ___________________________________________

## (undated) NOTE — LETTER
PATIENT NAME: Mello Alanis   : 3/3/1965       Waiver      DATE: 2024     Dear Patient,    Thank you for choosing [facility] as your health care provider. Your physician has deemed the following medical service(s) necessary.  However, your insurance plan may not pay for all of your health care and costs and may deny payment for this service.  The fact that your insurance plan does not pay for an item or service does not mean you should not receive it.  The purpose of this form is to help you make an informed decision about whether or not you want to receive this service(s) that may not be paid for by your insurance plan.      ESTIMATED COST: Shingrix: $337  Administration: $65  Total: $402          I understand that the above mentioned service(s) or supply may not be covered by my insurance company.  I agree to be financially responsible for the cost of this service or supply in the event my insurance denies payment as a non-covered benefit.      Patient/Insured Signature: ___________________________________________